# Patient Record
Sex: MALE | Race: WHITE | Employment: STUDENT | URBAN - METROPOLITAN AREA
[De-identification: names, ages, dates, MRNs, and addresses within clinical notes are randomized per-mention and may not be internally consistent; named-entity substitution may affect disease eponyms.]

---
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
## 2021-01-11 ENCOUNTER — OFFICE VISIT (OUTPATIENT)
Dept: URGENT CARE | Age: 21
End: 2021-01-11
Payer: COMMERCIAL

## 2021-01-11 VITALS — TEMPERATURE: 98 F | HEART RATE: 90 BPM | OXYGEN SATURATION: 96 % | RESPIRATION RATE: 18 BRPM

## 2021-01-11 DIAGNOSIS — Z20.822 EXPOSURE TO COVID-19 VIRUS: Primary | ICD-10-CM

## 2021-01-11 DIAGNOSIS — R09.89 RUNNY NOSE: ICD-10-CM

## 2021-01-11 PROCEDURE — 99202 OFFICE O/P NEW SF 15 MIN: CPT | Performed by: NURSE PRACTITIONER

## 2021-01-11 NOTE — PROGRESS NOTES
Subjective: (As above and below)       This patient was seen in [facility] at [address] [place] [address] Urgent [facility] while outdoors at their vehicle due to COVID-19 pandemic with PPE and focused examination in order to decrease community viral transmission. The patient/guardian gave verbal consent to treat. Chief Complaint   Patient presents with    Concern For COVID-19 (Coronavirus)     Pts mother reports exposure to COVID 19, reports cough and runny nose since yesterday     Wilda Calderon is a 21 y.o. male who presents for evaluation of : sore throat and runny nose. Symptom onset 2 days ago . Preceding illness: no.  No other identified aggravating or alleviating factors. Symptoms are constant and overall unchanged. Promotes no decrease in PO intake of fluids. Denies: severe lethargy, SOB, syncope/near syncope, vomiting/diarrhea, chest pain, chest pain with breathing, labored breathing, severe headache, non-intractable fevers . Recent travel: no  Known Exposure to COVID-19: yes  Known flu or strep contact: no       Review of Systems - negative except as listed above    Reviewed PmHx, RxHx, FmHx, SocHx, AllgHx and updated in chart. Family History   Problem Relation Age of Onset    No Known Problems Mother     No Known Problems Father     No Known Problems Sister     Other Other         Scot Syndrome    Bipolar Disorder Cousin        Past Medical History:   Diagnosis Date    Environmental allergies       Social History     Socioeconomic History    Marital status: SINGLE     Spouse name: Not on file    Number of children: Not on file    Years of education: Not on file    Highest education level: Not on file   Tobacco Use    Smoking status: Never Smoker    Smokeless tobacco: Never Used          Current Outpatient Medications   Medication Sig    metFORMIN (GLUCOPHAGE) 500 mg tablet Take 1 Tab by mouth two (2) times daily (with meals).     methylphenidate ER 54 mg 24 hr tab Take 1 Tab by mouth every morning. Max Daily Amount: 54 mg. Do not cut or chew    guanFACINE ER (INTUNIV) 4 mg Tb24 ER tablet Take 1 Tab by mouth nightly.  cetirizine (ZYRTEC) 10 mg tablet Take 10 mg by mouth daily. No current facility-administered medications for this visit. Objective:     Vitals:    01/11/21 1809   Pulse: 90   Resp: 18   Temp: 98 °F (36.7 °C)   SpO2: 96%       Physical Exam  General appearance  appears well hydrated and does not appear toxic, no acute distress  Eyes - EOMs intact. Non injected. No scleral icterus   Ears - no external swelling  Nose - nasal congestion. No purulent drainage  Mouth - OP clear without swelling, exudate or lesion. Mucus membranes moist. Uvula midline. Neck/Lymphatics  trachea midline, full AROM  Chest - Normal breathing effort no wheeze rales, rhonchi or diminishments bilaterally. Heart - RRR, no murmurs  Skin - no observable rashes or pallor  Neurologic- alert and oriented x 3  Psychiatric- normal mood, behavior and though content. Assessment/ Plan:     1. Exposure to COVID-19 virus    - NOVEL CORONAVIRUS (COVID-19)    2. Runny nose    - NOVEL CORONAVIRUS (COVID-19)      No evidence suggesting complication of illness at this time. Will discharge home with close monitoring and follow up.   To follow CDC isolation/quarantine guidelines  Supportive home care for mild symptoms advised- maintain adequate fluid intake, lozenges, over the counter Tylenol (for fever, aches, pains, chills), deep breathing exercises  Recommendation for self isolation/quarantine based on current CDC guidelines        Wade Cat NP

## 2021-01-14 LAB — SARS-COV-2, NAA: NOT DETECTED

## 2021-03-18 ENCOUNTER — TELEPHONE (OUTPATIENT)
Dept: PSYCHIATRY | Age: 21
End: 2021-03-18

## 2021-03-18 RX ORDER — METFORMIN HYDROCHLORIDE 500 MG/1
500 TABLET ORAL 2 TIMES DAILY WITH MEALS
Qty: 60 TAB | Refills: 2 | Status: SHIPPED | OUTPATIENT
Start: 2021-03-18 | End: 2021-03-22 | Stop reason: SDUPTHER

## 2021-03-22 ENCOUNTER — HOSPITAL ENCOUNTER (OUTPATIENT)
Dept: BEHAVIORAL/MENTAL HEALTH | Age: 21
Discharge: HOME | End: 2021-03-22

## 2021-03-22 VITALS
HEART RATE: 88 BPM | WEIGHT: 255 LBS | BODY MASS INDEX: 42.43 KG/M2 | DIASTOLIC BLOOD PRESSURE: 64 MMHG | SYSTOLIC BLOOD PRESSURE: 99 MMHG

## 2021-03-22 DIAGNOSIS — F90.2 ADHD (ATTENTION DEFICIT HYPERACTIVITY DISORDER), COMBINED TYPE: ICD-10-CM

## 2021-03-22 RX ORDER — ARIPIPRAZOLE 5 MG/1
5 TABLET ORAL
Qty: 90 TAB | Refills: 1 | Status: SHIPPED | OUTPATIENT
Start: 2021-03-22 | End: 2021-09-23 | Stop reason: SDUPTHER

## 2021-03-22 RX ORDER — METFORMIN HYDROCHLORIDE 500 MG/1
500 TABLET ORAL 2 TIMES DAILY WITH MEALS
Qty: 180 TAB | Refills: 1 | Status: SHIPPED | OUTPATIENT
Start: 2021-03-22 | End: 2021-09-23 | Stop reason: SDUPTHER

## 2021-03-22 RX ORDER — METHYLPHENIDATE HYDROCHLORIDE 54 MG/1
54 TABLET, EXTENDED RELEASE ORAL
Qty: 30 TAB | Refills: 0 | Status: SHIPPED | OUTPATIENT
Start: 2021-04-22 | End: 2021-08-17 | Stop reason: SDUPTHER

## 2021-03-22 RX ORDER — METHYLPHENIDATE HYDROCHLORIDE 54 MG/1
54 TABLET ORAL DAILY
Qty: 30 TAB | Refills: 0 | Status: SHIPPED | OUTPATIENT
Start: 2021-05-21 | End: 2021-03-22

## 2021-03-22 RX ORDER — METHYLPHENIDATE HYDROCHLORIDE 54 MG/1
54 TABLET, EXTENDED RELEASE ORAL
Qty: 30 TAB | Refills: 0 | Status: SHIPPED | OUTPATIENT
Start: 2021-03-22 | End: 2021-07-16 | Stop reason: SDUPTHER

## 2021-03-22 RX ORDER — METHYLPHENIDATE HYDROCHLORIDE 54 MG/1
54 TABLET ORAL DAILY
Qty: 30 TAB | Refills: 0 | Status: SHIPPED | OUTPATIENT
Start: 2021-04-21 | End: 2021-03-22

## 2021-03-22 RX ORDER — METHYLPHENIDATE HYDROCHLORIDE 54 MG/1
54 TABLET, EXTENDED RELEASE ORAL
Qty: 30 TAB | Refills: 0 | Status: SHIPPED | OUTPATIENT
Start: 2021-05-21 | End: 2021-08-17 | Stop reason: SDUPTHER

## 2021-03-22 RX ORDER — GUANFACINE 4 MG/1
4 TABLET, EXTENDED RELEASE ORAL
Qty: 90 TAB | Refills: 1 | Status: SHIPPED | OUTPATIENT
Start: 2021-03-22 | End: 2021-09-23 | Stop reason: SDUPTHER

## 2021-03-22 RX ORDER — METHYLPHENIDATE HYDROCHLORIDE 54 MG/1
54 TABLET ORAL DAILY
Qty: 30 TAB | Refills: 0 | Status: SHIPPED | OUTPATIENT
Start: 2021-03-22 | End: 2021-03-22

## 2021-03-22 NOTE — BH NOTES
Name: Umang Jackson    MRN:  [id number]   Time In: 4:10 PM     Time Out: 4:46 PM  Date: 3/22/2021           Collateral: mother, Donn Tran      Patient Identification: Umang Jackson is an 24year old single male high school graduate. Marquise Demarco lives with his parents and younger sister. He was diagnosed with autism as a toddler. Progress Note: Marquise Demarco has been doing at his new program through [employer]. He is going on daily outings with them and helping at a nonprofit called [employer] [facility] [address]. He likes his group and has only had one problem. He was looking at porn on the bus ride. Marquise Demarco has been sleeping well. He went for a 3 day stretch during which he barely slept. He is now sleeping fine and generally asks for his nighttime medications around 9 or 10. He needs to wake up at 5:30 on center days. He wakes up grumpy and needs a lot of reminders to keep getting ready. On weekends he generally sleeps until 10 and his mood is more agreeable. [facility] is concerned because Marquise Demarco is often angry and disagreeable with her. He tells her he doesn't love her. She wonders whether his mood is a problem. He is not negative or angry at his day program.    Marquise Demarco was soiling his pants for a while. He has seen a gastroenterologist who did not find any problems and suggested a stool softener. Marquise Demarco wants to know whether he can have alcohol because he is 21 now. He likes [employer] hard lemonade. He doesn't want to take his medication because he can't drink with it. Mental Status Examination    I. Reliability in Providing Information: Good (03/22/21 1702)    II. Personal Presentation: Looks stated age;Dresses appropriately; Other (comment)(obese) (03/22/21 1702)    III. Motor Activity: Normal;Unremarkable (03/22/21 1702)    IV. Speech Pattern: Normal rate;Normal rhythm (03/22/21 1702)    V. Mood: Euthymic (03/22/21 1702)    Vl. Eye Contact: Fleeting (03/22/21 1702)    Vll.  Affect: Appropriate;Smiling (03/22/21 1702)    VllI. Thought Processes        Thought Process: Organized;Goal directed (03/22/21 1702)        Thought Content: Unremarkable (03/22/21 1702)        Hallucinations: None (03/22/21 1702)        Delusions: None (03/22/21 1702)        Suicidal Ideation/Attempts: No (03/22/21 1702)                 Homicidal Ideation/Attempts: No (03/22/21 1702)        Obsessional and Compulsive Symptoms: Absent (03/22/21 1702)    IX. Cognitive Functions       Orientation Level: Oriented x4 (03/22/21 1702)       Neurologic State: Alert (03/22/21 1702)       Attention/Concentration: Attentive (03/22/21 1702)       Abstract Thinking: [place] (03/22/21 1702)       Estimate of Intelligence: Average (03/22/21 1702)       Judgment: Fair (03/22/21 1702)       Insight: Fair (03/22/21 1702)       Memory evaluation: No (03/22/21 1702)                                    X.Risks: None evident (03/22/21 1702)     XI. Strengths and Assets Inventory: Intelligence; Family Support; Cooperative; Adequate living arrangements; Interests/Hobbies (03/22/21 1702)    VITALS:     Patient Vitals for the past 24 hrs:   Pulse BP   03/22/21 1618 88 99/64     Wt Readings from Last 3 Encounters:   03/22/21 115.7 kg (255 lb)   10/07/20 111.1 kg (245 lb)   01/21/20 119.3 kg (263 lb) (>99 %, Z= 2.59)*     * Growth percentiles are based on CDC (Boys, 2-20 Years) data. Temp Readings from Last 3 Encounters:   01/11/21 98 °F (36.7 °C)     BP Readings from Last 3 Encounters:   03/22/21 99/64   10/21/19 110/68   08/13/19 110/70     Pulse Readings from Last 3 Encounters:   03/22/21 88   01/11/21 90   10/21/19 100       Assessment: Deisy Peterson is likely a bit sleep deprived which is causing the irritability on center days. He is not going to sleep earlier on those days.       DSM 5 Diagnoses:     Patient Active Problem List    Diagnosis Date Noted    ADHD (attention deficit hyperactivity disorder), combined type 07/02/2018    Autistic disorder 07/02/2018         Plan:   1. Continue Methylphenidate ER 54 mg po daily in the AM.  2.  Continue Intuniv 4 mg po daily at HS. 3.  Continue Abilify 5 mg HS -  4. Continue Metformin 500 mg po 2 times daily. 5.  Return for follow up in 6 months. 6.  Suggested that Marquise Demarco could have 1/2 [employer] Lemonade occasionally.

## 2021-06-03 ENCOUNTER — HOSPITAL ENCOUNTER (EMERGENCY)
Age: 21
Discharge: HOME | End: 2021-06-03
Attending: EMERGENCY MEDICINE
Payer: COMMERCIAL

## 2021-06-03 VITALS
RESPIRATION RATE: 16 BRPM | HEIGHT: 64 IN | OXYGEN SATURATION: 100 % | WEIGHT: 257.5 LBS | BODY MASS INDEX: 43.96 KG/M2 | DIASTOLIC BLOOD PRESSURE: 99 MMHG | HEART RATE: 97 BPM | SYSTOLIC BLOOD PRESSURE: 149 MMHG | TEMPERATURE: 98 F

## 2021-06-03 DIAGNOSIS — K92.1 HEMATOCHEZIA: Primary | ICD-10-CM

## 2021-06-03 DIAGNOSIS — B37.89 CANDIDIASIS OF ANUS: ICD-10-CM

## 2021-06-03 LAB
ALBUMIN SERPL-MCNC: 4.1 G/DL (ref 3.5–5)
ALBUMIN/GLOB SERPL: 1.2 {RATIO} (ref 1.1–2.2)
ALP SERPL-CCNC: 42 U/L (ref 45–117)
ALT SERPL-CCNC: 67 U/L (ref 12–78)
ANION GAP SERPL CALC-SCNC: 5 MMOL/L (ref 5–15)
AST SERPL-CCNC: 51 U/L (ref 15–37)
BASOPHILS # BLD: 0 K/UL (ref 0–0.1)
BASOPHILS NFR BLD: 1 % (ref 0–1)
BILIRUB SERPL-MCNC: 0.4 MG/DL (ref 0.2–1)
BUN SERPL-MCNC: 10 MG/DL (ref 6–20)
BUN/CREAT SERPL: 12 (ref 12–20)
CALCIUM SERPL-MCNC: 9.4 MG/DL (ref 8.5–10.1)
CHLORIDE SERPL-SCNC: 102 MMOL/L (ref 97–108)
CO2 SERPL-SCNC: 29 MMOL/L (ref 21–32)
CREAT SERPL-MCNC: 0.83 MG/DL (ref 0.7–1.3)
DIFFERENTIAL METHOD BLD: NORMAL
EOSINOPHIL # BLD: 0.2 K/UL (ref 0–0.4)
EOSINOPHIL NFR BLD: 3 % (ref 0–7)
ERYTHROCYTE [DISTWIDTH] IN BLOOD BY AUTOMATED COUNT: 13.2 % (ref 11.5–14.5)
GLOBULIN SER CALC-MCNC: 3.3 G/DL (ref 2–4)
GLUCOSE SERPL-MCNC: 93 MG/DL (ref 65–100)
HCT VFR BLD AUTO: 42.2 % (ref 36.6–50.3)
HGB BLD-MCNC: 14.1 G/DL (ref 12.1–17)
IMM GRANULOCYTES # BLD AUTO: 0 K/UL (ref 0–0.04)
IMM GRANULOCYTES NFR BLD AUTO: 0 % (ref 0–0.5)
LYMPHOCYTES # BLD: 2.6 K/UL (ref 0.8–3.5)
LYMPHOCYTES NFR BLD: 47 % (ref 12–49)
MCH RBC QN AUTO: 29.3 PG (ref 26–34)
MCHC RBC AUTO-ENTMCNC: 33.4 G/DL (ref 30–36.5)
MCV RBC AUTO: 87.6 FL (ref 80–99)
MONOCYTES # BLD: 0.5 K/UL (ref 0–1)
MONOCYTES NFR BLD: 9 % (ref 5–13)
NEUTS SEG # BLD: 2.2 K/UL (ref 1.8–8)
NEUTS SEG NFR BLD: 40 % (ref 32–75)
NRBC # BLD: 0 K/UL (ref 0–0.01)
NRBC BLD-RTO: 0 PER 100 WBC
PLATELET # BLD AUTO: 268 K/UL (ref 150–400)
PMV BLD AUTO: 9.7 FL (ref 8.9–12.9)
POTASSIUM SERPL-SCNC: 3.9 MMOL/L (ref 3.5–5.1)
PROT SERPL-MCNC: 7.4 G/DL (ref 6.4–8.2)
RBC # BLD AUTO: 4.82 M/UL (ref 4.1–5.7)
SODIUM SERPL-SCNC: 136 MMOL/L (ref 136–145)
WBC # BLD AUTO: 5.5 K/UL (ref 4.1–11.1)

## 2021-06-03 PROCEDURE — 99281 EMR DPT VST MAYX REQ PHY/QHP: CPT

## 2021-06-03 PROCEDURE — 36415 COLL VENOUS BLD VENIPUNCTURE: CPT

## 2021-06-03 PROCEDURE — 80053 COMPREHEN METABOLIC PANEL: CPT

## 2021-06-03 PROCEDURE — 85025 COMPLETE CBC W/AUTO DIFF WBC: CPT

## 2021-06-03 RX ORDER — ZINC OXIDE 400 MG/G
PASTE TOPICAL 2 TIMES DAILY
Qty: 1 TUBE | Refills: 0 | Status: SHIPPED | OUTPATIENT
Start: 2021-06-03

## 2021-06-03 NOTE — ED NOTES
I have reviewed discharge instructions with the parent. The parent verbalized understanding. Alert and stable for discharge.

## 2021-06-03 NOTE — ED PROVIDER NOTES
EMERGENCY DEPARTMENT HISTORY AND PHYSICAL EXAM      Date: 6/3/2021  Patient Name: Luisa Rayo    History of Presenting Illness     Chief Complaint   Patient presents with    Rectal Bleeding     dark red blood on the paper this morning. He had a similar episode 3 weeks ago. mom made an appointment with Dr Jovi Allen for evaluation. Staff there told her that if it happened in the meantime to come to the ER       History Provided By: Patient    HPI: Luisa Rayo, 24 y.o. male with a past medical history significant for Autism, ADHD presents to the ED with cc of intermittent, moderate bright red blood per rectum. Patient had 2 episodes. The first episode was 3 weeks ago and lasted about 12 hours. Second episode was last night and this morning. He had 2 episodes of bright red blood while wiping his anus after going to the bathroom. He has had a chronic problem of losing control of his bowels over the last several months. He was seen by GI recently and had a rectal exam was told it was normal.  His mother reports he has a poor diet and eats mostly chicken and potatoes. She has tried to add fiber supplements but he has been resistant to this. He is not having any diarrhea. He does have some moderate constipation. He has not had any fevers, chills or vomiting. He is currently not in pain but reports some mild discomfort around the anus. No other associated symptoms. No other exacerbating ameliorating factors. There are no other complaints, changes, or physical findings at this time. PCP: Brittni Orozco NP    No current facility-administered medications on file prior to encounter. Current Outpatient Medications on File Prior to Encounter   Medication Sig Dispense Refill    ARIPiprazole (ABILIFY) 5 mg tablet Take 1 Tab by mouth nightly. 90 Tab 1    guanFACINE ER (INTUNIV) 4 mg Tb24 ER tablet Take 1 Tab by mouth nightly.  90 Tab 1    metFORMIN (GLUCOPHAGE) 500 mg tablet Take 1 Tab by mouth two (2) times daily (with meals). 705 Tab 1    Concerta 54 mg CR tablet Take 1 Tab by mouth every morning. Max Daily Amount: 54 mg. 30 Tab 0    Concerta 54 mg CR tablet Take 1 Tab by mouth every morning. Max Daily Amount: 54 mg. 30 Tab 0    Concerta 54 mg CR tablet Take 1 Tab by mouth every morning. Max Daily Amount: 54 mg. 30 Tab 0    cetirizine (ZYRTEC) 10 mg tablet Take 10 mg by mouth daily. Past History     Past Medical History:  Past Medical History:   Diagnosis Date    Environmental allergies        Past Surgical History:  Past Surgical History:   Procedure Laterality Date    HX MYRINGOTOMY      12 surgical placement tubes    HX TONSIL AND ADENOIDECTOMY         Family History:  Family History   Problem Relation Age of Onset    No Known Problems Mother     No Known Problems Father     No Known Problems Sister     Other Other         Scot Syndrome    Bipolar Disorder Cousin        Social History:  Social History     Tobacco Use    Smoking status: Never Smoker    Smokeless tobacco: Never Used   Substance Use Topics    Alcohol use: Not on file    Drug use: Not on file       Allergies:  No Known Allergies      Review of Systems   Review of Systems   Constitutional: Negative for chills, diaphoresis, fatigue and fever. HENT: Negative for ear pain and sore throat. Eyes: Negative for pain and redness. Respiratory: Negative for cough and shortness of breath. Cardiovascular: Negative for chest pain and leg swelling. Gastrointestinal: Positive for blood in stool. Negative for abdominal pain, diarrhea, nausea and vomiting. Endocrine: Negative for cold intolerance and heat intolerance. Genitourinary: Negative for flank pain and hematuria. Musculoskeletal: Negative for back pain and neck stiffness. Skin: Negative for rash and wound. Neurological: Negative for dizziness, syncope and headaches. All other systems reviewed and are negative.       Physical Exam   Physical Exam  Vitals and nursing note reviewed. Constitutional:       Appearance: He is well-developed. HENT:      Head: Normocephalic and atraumatic. Mouth/Throat:      Pharynx: No oropharyngeal exudate. Eyes:      Conjunctiva/sclera: Conjunctivae normal.      Pupils: Pupils are equal, round, and reactive to light. Cardiovascular:      Rate and Rhythm: Normal rate and regular rhythm. Heart sounds: No murmur heard. Pulmonary:      Effort: Pulmonary effort is normal. No respiratory distress. Breath sounds: Normal breath sounds. No wheezing. Abdominal:      General: Bowel sounds are normal. There is no distension. Palpations: Abdomen is soft. Tenderness: There is no abdominal tenderness. Genitourinary:         Comments: Moderate erythematous plaques around the anus that is tender to palpation. The area is sharply demarcated. There is no thrombosed external hemorrhoid. On digital exam there is some bright red blood at the tip of the finger with rectal exam.  There is no tenderness or fluctuance, no findings of abscess. Stool is brown. Musculoskeletal:         General: No deformity. Normal range of motion. Cervical back: Normal range of motion. Skin:     General: Skin is warm and dry. Findings: No rash. Neurological:      Mental Status: He is alert and oriented to person, place, and time.       Coordination: Coordination normal.   Psychiatric:         Behavior: Behavior normal.         Diagnostic Study Results     Labs -     Recent Results (from the past 24 hour(s))   CBC WITH AUTOMATED DIFF    Collection Time: 06/03/21 12:10 PM   Result Value Ref Range    WBC 5.5 4.1 - 11.1 K/uL    RBC 4.82 4.10 - 5.70 M/uL    HGB 14.1 12.1 - 17.0 g/dL    HCT 42.2 36.6 - 50.3 %    MCV 87.6 80.0 - 99.0 FL    MCH 29.3 26.0 - 34.0 PG    MCHC 33.4 30.0 - 36.5 g/dL    RDW 13.2 11.5 - 14.5 %    PLATELET 383 830 - 787 K/uL    MPV 9.7 8.9 - 12.9 FL    NRBC 0.0 0  WBC    ABSOLUTE NRBC 0. 00 0.00 - 0.01 K/uL    NEUTROPHILS 40 32 - 75 %    LYMPHOCYTES 47 12 - 49 %    MONOCYTES 9 5 - 13 %    EOSINOPHILS 3 0 - 7 %    BASOPHILS 1 0 - 1 %    IMMATURE GRANULOCYTES 0 0.0 - 0.5 %    ABS. NEUTROPHILS 2.2 1.8 - 8.0 K/UL    ABS. LYMPHOCYTES 2.6 0.8 - 3.5 K/UL    ABS. MONOCYTES 0.5 0.0 - 1.0 K/UL    ABS. EOSINOPHILS 0.2 0.0 - 0.4 K/UL    ABS. BASOPHILS 0.0 0.0 - 0.1 K/UL    ABS. IMM. GRANS. 0.0 0.00 - 0.04 K/UL    DF AUTOMATED     METABOLIC PANEL, COMPREHENSIVE    Collection Time: 06/03/21 12:10 PM   Result Value Ref Range    Sodium 136 136 - 145 mmol/L    Potassium 3.9 3.5 - 5.1 mmol/L    Chloride 102 97 - 108 mmol/L    CO2 29 21 - 32 mmol/L    Anion gap 5 5 - 15 mmol/L    Glucose 93 65 - 100 mg/dL    BUN 10 6 - 20 MG/DL    Creatinine 0.83 0.70 - 1.30 MG/DL    BUN/Creatinine ratio 12 12 - 20      GFR est AA >60 >60 ml/min/1.73m2    GFR est non-AA >60 >60 ml/min/1.73m2    Calcium 9.4 8.5 - 10.1 MG/DL    Bilirubin, total 0.4 0.2 - 1.0 MG/DL    ALT (SGPT) 67 12 - 78 U/L    AST (SGOT) 51 (H) 15 - 37 U/L    Alk. phosphatase 42 (L) 45 - 117 U/L    Protein, total 7.4 6.4 - 8.2 g/dL    Albumin 4.1 3.5 - 5.0 g/dL    Globulin 3.3 2.0 - 4.0 g/dL    A-G Ratio 1.2 1.1 - 2.2         Radiologic Studies -   No orders to display     CT Results  (Last 48 hours)    None        CXR Results  (Last 48 hours)    None            Medical Decision Making   I am the first provider for this patient. I reviewed the vital signs, available nursing notes, past medical history, past surgical history, family history and social history. Vital Signs-Reviewed the patient's vital signs. Patient Vitals for the past 12 hrs:   Temp Pulse Resp BP SpO2   06/03/21 1200 98 °F (36.7 °C) 97 16 (!) 149/99 100 %       Records Reviewed: Nursing records and medical records reviewed    MDM:  Patient presents with lower GI bleeding.  Currently with stable vitals and benign abdominal exam.  DDx: AVM, diverticulosis, diverticulitis, IBD, IBS, colitis, hemorrhoids. Will obtain two large bore IV's, provide IVF hydration, check labs including type and screen, check rectal exam and monitor closely for the need for additional imaging. Provider Notes (Medical Decision Making):   Patient is a 26-year-old male presenting with 2 episodes of bright red blood per rectum. He does have findings of a fungal infection of the skin around the anus. I suspect there may be an internal hemorrhoid as well. However his hemoglobin is normal, his abdominal exam is benign, and he has a GI physician that he sees as an outpatient. I will send a note to his doctors he had called the office today. I think a close outpatient follow-up is very reasonable. He may need to have a sigmoidoscopy or anoscopy to further investigate the source of bleeding. I will treat him with nystatin cream as I am not sure this is truly a fungal infection. Per his mother he does not keep the area dry after showers and does not wipe well. He he was given instructions on drying the area, keeping it dry, and applying some Desitin ointment. ED Course:   Initial assessment performed. The patients presenting problems have been discussed, and they are in agreement with the care plan formulated and outlined with them. I have encouraged them to ask questions as they arise throughout their visit. Critical Care:  None      Disposition:  2:36 PM  Kurtis Chaparro's  results have been reviewed with him. He has been counseled regarding his diagnosis. He verbally conveys understanding and agreement of the signs, symptoms, diagnosis, treatment and prognosis and additionally agrees to follow up as recommended with Dr. Alla Almonte NP in 24 - 48 hours. He also agrees with the care-plan and conveys that all of his questions have been answered.   I have also put together some discharge instructions for him that include: 1) educational information regarding their diagnosis, 2) how to care for their diagnosis at home, as well a 3) list of reasons why they would want to return to the ED prior to their follow-up appointment, should their condition change. DISCHARGE PLAN:  1. Current Discharge Medication List      START taking these medications    Details   zinc oxide-cod liver oil (Desitin) 40 % paste Apply  to affected area two (2) times a day. Qty: 1 Tube, Refills: 0  Start date: 6/3/2021           2. Follow-up Information     Follow up With Specialties Details Why Contact Radha Farias MD Gastroenterology In 1 week For a follow-up evaluation. You may need a scope to further evaluate this bleeding. Please keep the area dry around your anus. [place]  [phone]      [facility] EMERGENCY DEPT Emergency Medicine In 1 day If symptoms worsen [address]  [address] [facility]  [phone]        3. Return to ED if worse     Diagnosis     Clinical Impression:   1. Hematochezia    2. Candidiasis of anus        Attestations:    Anne Marie Wallace MD    Please note that this dictation was completed with Trust[employer], the computer voice recognition software. Quite often unanticipated grammatical, syntax, homophones, and other interpretive errors are inadvertently transcribed by the computer software. Please disregard these errors. Please excuse any errors that have escaped final proofreading. Thank you.

## 2021-06-03 NOTE — DISCHARGE INSTRUCTIONS
It was a pleasure taking care of you in our Emergency Department today. We know that when you come to [facility], you are entrusting us with your health, comfort, and safety. Our physicians and nurses honor that trust, and truly appreciate the opportunity to care for you and your loved ones. We also value your feedback. If you receive a survey about your Emergency Department experience today, please fill it out. We care about our patients' feedback, and we listen to what you have to say. Thank you!

## 2021-06-29 ENCOUNTER — TELEPHONE (OUTPATIENT)
Dept: PSYCHIATRY | Age: 21
End: 2021-06-29

## 2021-07-16 DIAGNOSIS — F90.2 ADHD (ATTENTION DEFICIT HYPERACTIVITY DISORDER), COMBINED TYPE: ICD-10-CM

## 2021-07-16 RX ORDER — METHYLPHENIDATE HYDROCHLORIDE 54 MG/1
54 TABLET, EXTENDED RELEASE ORAL
Qty: 30 TABLET | Refills: 0 | Status: SHIPPED | OUTPATIENT
Start: 2021-07-16 | End: 2021-08-17 | Stop reason: SDUPTHER

## 2021-08-17 ENCOUNTER — TELEPHONE (OUTPATIENT)
Dept: PSYCHIATRY | Age: 21
End: 2021-08-17

## 2021-08-17 DIAGNOSIS — F90.2 ADHD (ATTENTION DEFICIT HYPERACTIVITY DISORDER), COMBINED TYPE: ICD-10-CM

## 2021-08-17 RX ORDER — METHYLPHENIDATE HYDROCHLORIDE 54 MG/1
54 TABLET, EXTENDED RELEASE ORAL
Qty: 30 TABLET | Refills: 0 | Status: SHIPPED | OUTPATIENT
Start: 2021-09-16 | End: 2021-11-18 | Stop reason: SDUPTHER

## 2021-08-17 RX ORDER — METHYLPHENIDATE HYDROCHLORIDE 54 MG/1
54 TABLET, EXTENDED RELEASE ORAL
Qty: 30 TABLET | Refills: 0 | Status: SHIPPED | OUTPATIENT
Start: 2021-10-15 | End: 2021-12-13 | Stop reason: SDUPTHER

## 2021-08-17 RX ORDER — METHYLPHENIDATE HYDROCHLORIDE 54 MG/1
54 TABLET, EXTENDED RELEASE ORAL
Qty: 30 TABLET | Refills: 0 | Status: SHIPPED | OUTPATIENT
Start: 2021-08-17 | End: 2021-11-18 | Stop reason: SDUPTHER

## 2021-09-23 ENCOUNTER — HOSPITAL ENCOUNTER (OUTPATIENT)
Dept: BEHAVIORAL/MENTAL HEALTH | Age: 21
Discharge: HOME | End: 2021-09-23
Payer: COMMERCIAL

## 2021-09-23 DIAGNOSIS — F90.2 ADHD (ATTENTION DEFICIT HYPERACTIVITY DISORDER), COMBINED TYPE: ICD-10-CM

## 2021-09-23 DIAGNOSIS — F84.0 AUTISTIC DISORDER: ICD-10-CM

## 2021-09-23 PROCEDURE — 99442 PR PHYS/QHP TELEPHONE EVALUATION 11-20 MIN: CPT | Performed by: NURSE PRACTITIONER

## 2021-09-23 RX ORDER — ARIPIPRAZOLE 5 MG/1
5 TABLET ORAL
Qty: 90 TABLET | Refills: 1 | Status: SHIPPED | OUTPATIENT
Start: 2021-09-23 | End: 2021-12-20 | Stop reason: SDUPTHER

## 2021-09-23 RX ORDER — GUANFACINE 4 MG/1
4 TABLET, EXTENDED RELEASE ORAL
Qty: 90 TABLET | Refills: 1 | Status: SHIPPED | OUTPATIENT
Start: 2021-09-23 | End: 2021-12-20 | Stop reason: SDUPTHER

## 2021-09-23 RX ORDER — METFORMIN HYDROCHLORIDE 500 MG/1
500 TABLET ORAL 2 TIMES DAILY WITH MEALS
Qty: 180 TABLET | Refills: 1 | Status: SHIPPED | OUTPATIENT
Start: 2021-09-23

## 2021-09-23 NOTE — BH NOTES
Name: Chaparro Field    MRN:  [id number]   Time In: 4:40 PM     Time Out: 4:57 PM  Date: 9/23/2021           Collateral: mother, Di Epley      Patient Identification: Chaparro Field is an 24year old single male high school graduate. Cristina Bowie lives with his parents and younger sister. He was diagnosed with autism as a toddler. Progress Note: This was a phone visit by audio only technology due to coronavirus pandemic. The patient is aware that he may receive a bill for this audio only encounter, depending on insurance coverage, and has provided verbal consent to proceed. Cristina Bowie was unable to come in today because his father was exposed to Covid. Cristina Bowie has been doing fairly well. He is regularly attending his day program and generally cooperating. Occasionally he does not want to go and makes up excuses. 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  He is able to complete the work and gets along with his coworkers. Teresa reports that several times per week Cristina Bowie throws up his pills. This can occur in the AM or PM.  He states that he takes too many pills. He does not vomit when not taking pills. Kurtis's behavior is about the same. He is working with an [facility] specialist and making little progress. Most of his behaviors occur at home. Cristina Bowie generally sleeps well. He wakes up and watches TV once or twice per week. He is able to sleep in on the weekends to make up for it. Mental Status Examination    I. Reliability in Providing Information: Fair (09/23/21 1655)    II. Personal Presentation: Other (comment) (phone consult) (09/23/21 1655)    III. Motor Activity: Other (comment) (09/23/21 1655)    IV. Speech Pattern: Normal rate;Normal rhythm (09/23/21 1655)    V. Mood: Euthymic (09/23/21 1655)    Vl. Eye Contact: Other (Comment) (phone consult) (09/23/21 1655)    Vll. Affect: Other (comment) (09/23/21 1655)    VllI.  Thought Processes Thought Process: Organized;Goal directed (09/23/21 1655)        Thought Content: Unremarkable (09/23/21 1655)        Hallucinations: None (09/23/21 1655)        Delusions: None (09/23/21 1655)        Suicidal Ideation/Attempts: No (09/23/21 1655)                 Homicidal Ideation/Attempts: No (09/23/21 1655)        Obsessional and Compulsive Symptoms: Absent (09/23/21 1655)    IX. Cognitive Functions       Orientation Level: Oriented x4 (09/23/21 1655)       Neurologic State: Alert (09/23/21 1655)       Attention/Concentration: Attentive (09/23/21 1655)       Abstract Thinking: [place] (09/23/21 1655)       Estimate of Intelligence: Average (09/23/21 1655)       Judgment: Fair (09/23/21 1655)       Insight: Fair (09/23/21 1655)       Memory evaluation: No (09/23/21 1655)                                    X.      XI. Strengths and Assets Inventory: Intelligence; Family Support; Cooperative; Adequate living arrangements; Interests/Hobbies (09/23/21 1655)    VITALS:     No data found. Wt Readings from Last 3 Encounters:   06/03/21 116.8 kg (257 lb 8 oz)   03/22/21 115.7 kg (255 lb)   10/07/20 111.1 kg (245 lb)     Temp Readings from Last 3 Encounters:   06/03/21 98 °F (36.7 °C)   01/11/21 98 °F (36.7 °C)     BP Readings from Last 3 Encounters:   06/03/21 (!) 149/99   03/22/21 99/64   10/21/19 110/68     Pulse Readings from Last 3 Encounters:   06/03/21 97   03/22/21 88   01/11/21 90       Assessment: Iqra Clement likely has a hyperactive gag reflex. He seems to be stable on his current medications. DSM 5 Diagnoses:     Patient Active Problem List    Diagnosis Date Noted    ADHD (attention deficit hyperactivity disorder), combined type 07/02/2018    Autistic disorder 07/02/2018         Plan:   1. Continue Methylphenidate ER 54 mg po daily in the AM.  2.  Continue Intuniv 4 mg po daily at HS. 3.  Continue Abilify 5 mg HS -  4. Continue Metformin 500 mg po 2 times daily.    5.  Return for follow up in 6 months.

## 2021-10-07 ENCOUNTER — TELEPHONE (OUTPATIENT)
Dept: PSYCHIATRY | Age: 21
End: 2021-10-07

## 2021-11-18 ENCOUNTER — TELEPHONE (OUTPATIENT)
Dept: PSYCHIATRY | Age: 21
End: 2021-11-18

## 2021-11-18 DIAGNOSIS — F90.2 ADHD (ATTENTION DEFICIT HYPERACTIVITY DISORDER), COMBINED TYPE: ICD-10-CM

## 2021-11-18 RX ORDER — METHYLPHENIDATE HYDROCHLORIDE 54 MG/1
54 TABLET, EXTENDED RELEASE ORAL
Qty: 30 TABLET | Refills: 0 | Status: SHIPPED | OUTPATIENT
Start: 2021-12-17

## 2021-11-18 RX ORDER — METHYLPHENIDATE HYDROCHLORIDE 54 MG/1
54 TABLET, EXTENDED RELEASE ORAL
Qty: 30 TABLET | Refills: 0 | Status: SHIPPED | OUTPATIENT
Start: 2021-11-18

## 2021-12-13 ENCOUNTER — TELEPHONE (OUTPATIENT)
Dept: PSYCHIATRY | Age: 21
End: 2021-12-13

## 2021-12-13 DIAGNOSIS — F90.2 ADHD (ATTENTION DEFICIT HYPERACTIVITY DISORDER), COMBINED TYPE: ICD-10-CM

## 2021-12-13 RX ORDER — METHYLPHENIDATE HYDROCHLORIDE 54 MG/1
54 TABLET, EXTENDED RELEASE ORAL
Qty: 90 TABLET | Refills: 0 | Status: SHIPPED | OUTPATIENT
Start: 2021-12-13 | End: 2021-12-17

## 2021-12-17 DIAGNOSIS — F90.0 ATTENTION DEFICIT HYPERACTIVITY DISORDER, INATTENTIVE TYPE: Primary | ICD-10-CM

## 2021-12-17 RX ORDER — METHYLPHENIDATE HYDROCHLORIDE 54 MG/1
54 TABLET ORAL
Qty: 90 TABLET | Refills: 0 | Status: SHIPPED | OUTPATIENT
Start: 2021-12-17

## 2021-12-20 ENCOUNTER — TELEPHONE (OUTPATIENT)
Dept: PSYCHIATRY | Age: 21
End: 2021-12-20

## 2021-12-20 DIAGNOSIS — F90.2 ADHD (ATTENTION DEFICIT HYPERACTIVITY DISORDER), COMBINED TYPE: ICD-10-CM

## 2021-12-20 RX ORDER — GUANFACINE 4 MG/1
4 TABLET, EXTENDED RELEASE ORAL
Qty: 90 TABLET | Refills: 1 | Status: SHIPPED | OUTPATIENT
Start: 2021-12-20

## 2021-12-20 RX ORDER — ARIPIPRAZOLE 5 MG/1
5 TABLET ORAL
Qty: 90 TABLET | Refills: 1 | Status: SHIPPED | OUTPATIENT
Start: 2021-12-20

## 2022-03-19 PROBLEM — F90.2 ADHD (ATTENTION DEFICIT HYPERACTIVITY DISORDER), COMBINED TYPE: Status: ACTIVE | Noted: 2018-07-02

## 2022-03-19 PROBLEM — F84.0 AUTISTIC DISORDER: Status: ACTIVE | Noted: 2018-07-02

## 2022-11-21 ENCOUNTER — TRANSCRIBE ORDER (OUTPATIENT)
Dept: SCHEDULING | Age: 22
End: 2022-11-21

## 2022-11-21 DIAGNOSIS — M54.50 CHRONIC MIDLINE LOW BACK PAIN WITHOUT SCIATICA: ICD-10-CM

## 2022-11-21 DIAGNOSIS — G89.29 CHRONIC MIDLINE LOW BACK PAIN WITHOUT SCIATICA: ICD-10-CM

## 2022-11-21 DIAGNOSIS — M79.18 BUTTOCK PAIN: ICD-10-CM

## 2022-11-21 DIAGNOSIS — M48.48XA STRESS FRACTURE OF SACRUM, INITIAL ENCOUNTER: ICD-10-CM

## 2022-11-21 DIAGNOSIS — M53.3 COCCYDYNIA: Primary | ICD-10-CM

## 2022-11-30 ENCOUNTER — TRANSCRIBE ORDER (OUTPATIENT)
Dept: SCHEDULING | Age: 22
End: 2022-11-30

## 2022-11-30 DIAGNOSIS — M48.48XA STRESS FRACTURE OF SACRUM, INITIAL ENCOUNTER: Primary | ICD-10-CM

## 2022-11-30 DIAGNOSIS — M53.3 COCCYGODYNIA: ICD-10-CM

## 2022-12-19 ENCOUNTER — HOSPITAL ENCOUNTER (OUTPATIENT)
Dept: MRI IMAGING | Age: 22
Discharge: HOME | End: 2022-12-19
Attending: PHYSICAL MEDICINE & REHABILITATION
Payer: COMMERCIAL

## 2022-12-19 DIAGNOSIS — M53.3 COCCYGODYNIA: ICD-10-CM

## 2022-12-19 DIAGNOSIS — M48.48XA STRESS FRACTURE OF SACRUM, INITIAL ENCOUNTER: ICD-10-CM

## 2022-12-19 PROCEDURE — 72195 MRI PELVIS W/O DYE: CPT

## 2023-01-27 ENCOUNTER — APPOINTMENT (OUTPATIENT)
Dept: GENERAL RADIOLOGY | Age: 23
End: 2023-01-27
Attending: PHYSICIAN ASSISTANT
Payer: MEDICAID

## 2023-01-27 ENCOUNTER — APPOINTMENT (OUTPATIENT)
Dept: ULTRASOUND IMAGING | Age: 23
End: 2023-01-27
Attending: PHYSICIAN ASSISTANT
Payer: MEDICAID

## 2023-01-27 ENCOUNTER — HOSPITAL ENCOUNTER (EMERGENCY)
Age: 23
Discharge: HOME | End: 2023-01-27
Attending: EMERGENCY MEDICINE
Payer: MEDICAID

## 2023-01-27 VITALS
TEMPERATURE: 98.2 F | BODY MASS INDEX: 47.46 KG/M2 | HEART RATE: 99 BPM | WEIGHT: 278 LBS | HEIGHT: 64 IN | SYSTOLIC BLOOD PRESSURE: 129 MMHG | DIASTOLIC BLOOD PRESSURE: 84 MMHG | RESPIRATION RATE: 16 BRPM | OXYGEN SATURATION: 95 %

## 2023-01-27 DIAGNOSIS — K76.0 HEPATIC STEATOSIS: ICD-10-CM

## 2023-01-27 DIAGNOSIS — R05.1 ACUTE COUGH: ICD-10-CM

## 2023-01-27 DIAGNOSIS — R10.11 ABDOMINAL PAIN, RIGHT UPPER QUADRANT: Primary | ICD-10-CM

## 2023-01-27 LAB
ALBUMIN SERPL-MCNC: 3.9 G/DL (ref 3.5–5)
ALBUMIN/GLOB SERPL: 1.1 (ref 1.1–2.2)
ALP SERPL-CCNC: 43 U/L (ref 45–117)
ALT SERPL-CCNC: 40 U/L (ref 12–78)
AMORPH CRY URNS QL MICRO: ABNORMAL
ANION GAP SERPL CALC-SCNC: 7 MMOL/L (ref 5–15)
APPEARANCE UR: ABNORMAL
AST SERPL-CCNC: 28 U/L (ref 15–37)
BACTERIA URNS QL MICRO: NEGATIVE /HPF
BASOPHILS # BLD: 0 K/UL (ref 0–0.1)
BASOPHILS NFR BLD: 0 % (ref 0–1)
BILIRUB SERPL-MCNC: 0.4 MG/DL (ref 0.2–1)
BILIRUB UR QL: NEGATIVE
BUN SERPL-MCNC: 12 MG/DL (ref 6–20)
BUN/CREAT SERPL: 12 (ref 12–20)
CALCIUM SERPL-MCNC: 9.5 MG/DL (ref 8.5–10.1)
CHLORIDE SERPL-SCNC: 104 MMOL/L (ref 97–108)
CO2 SERPL-SCNC: 30 MMOL/L (ref 21–32)
COLOR UR: ABNORMAL
COVID-19 RAPID TEST, COVR: NOT DETECTED
CREAT SERPL-MCNC: 0.98 MG/DL (ref 0.7–1.3)
DIFFERENTIAL METHOD BLD: ABNORMAL
EOSINOPHIL # BLD: 0.1 K/UL (ref 0–0.4)
EOSINOPHIL NFR BLD: 2 % (ref 0–7)
EPITH CASTS URNS QL MICRO: ABNORMAL /LPF
ERYTHROCYTE [DISTWIDTH] IN BLOOD BY AUTOMATED COUNT: 13.2 % (ref 11.5–14.5)
FLUAV AG NPH QL IA: NEGATIVE
FLUBV AG NOSE QL IA: NEGATIVE
GLOBULIN SER CALC-MCNC: 3.5 G/DL (ref 2–4)
GLUCOSE SERPL-MCNC: 102 MG/DL (ref 65–100)
GLUCOSE UR STRIP.AUTO-MCNC: NEGATIVE MG/DL
HCT VFR BLD AUTO: 42.3 % (ref 36.6–50.3)
HGB BLD-MCNC: 14.4 G/DL (ref 12.1–17)
HGB UR QL STRIP: NEGATIVE
IMM GRANULOCYTES # BLD AUTO: 0 K/UL (ref 0–0.04)
IMM GRANULOCYTES NFR BLD AUTO: 1 % (ref 0–0.5)
KETONES UR QL STRIP.AUTO: ABNORMAL MG/DL
LEUKOCYTE ESTERASE UR QL STRIP.AUTO: NEGATIVE
LIPASE SERPL-CCNC: 134 U/L (ref 73–393)
LYMPHOCYTES # BLD: 2.4 K/UL (ref 0.8–3.5)
LYMPHOCYTES NFR BLD: 36 % (ref 12–49)
MCH RBC QN AUTO: 29.8 PG (ref 26–34)
MCHC RBC AUTO-ENTMCNC: 34 G/DL (ref 30–36.5)
MCV RBC AUTO: 87.4 FL (ref 80–99)
MONOCYTES # BLD: 0.6 K/UL (ref 0–1)
MONOCYTES NFR BLD: 9 % (ref 5–13)
MUCOUS THREADS URNS QL MICRO: ABNORMAL /LPF
NEUTS SEG # BLD: 3.6 K/UL (ref 1.8–8)
NEUTS SEG NFR BLD: 52 % (ref 32–75)
NITRITE UR QL STRIP.AUTO: NEGATIVE
NRBC # BLD: 0 K/UL (ref 0–0.01)
NRBC BLD-RTO: 0 PER 100 WBC
PH UR STRIP: 7.5 (ref 5–8)
PLATELET # BLD AUTO: 251 K/UL (ref 150–400)
PMV BLD AUTO: 9.5 FL (ref 8.9–12.9)
POTASSIUM SERPL-SCNC: 3.9 MMOL/L (ref 3.5–5.1)
PROT SERPL-MCNC: 7.4 G/DL (ref 6.4–8.2)
PROT UR STRIP-MCNC: ABNORMAL MG/DL
RBC # BLD AUTO: 4.84 M/UL (ref 4.1–5.7)
RBC #/AREA URNS HPF: ABNORMAL /HPF (ref 0–5)
SODIUM SERPL-SCNC: 141 MMOL/L (ref 136–145)
SOURCE, COVRS: NORMAL
SP GR UR REFRACTOMETRY: 1.01 (ref 1–1.03)
UA: UC IF INDICATED,UAUC: ABNORMAL
UROBILINOGEN UR QL STRIP.AUTO: 0.2 EU/DL (ref 0.2–1)
WBC # BLD AUTO: 6.8 K/UL (ref 4.1–11.1)
WBC URNS QL MICRO: ABNORMAL /HPF (ref 0–4)

## 2023-01-27 PROCEDURE — 87804 INFLUENZA ASSAY W/OPTIC: CPT

## 2023-01-27 PROCEDURE — 81001 URINALYSIS AUTO W/SCOPE: CPT

## 2023-01-27 PROCEDURE — 76700 US EXAM ABDOM COMPLETE: CPT

## 2023-01-27 PROCEDURE — 99284 EMERGENCY DEPT VISIT MOD MDM: CPT

## 2023-01-27 PROCEDURE — 87635 SARS-COV-2 COVID-19 AMP PRB: CPT

## 2023-01-27 PROCEDURE — 36415 COLL VENOUS BLD VENIPUNCTURE: CPT

## 2023-01-27 PROCEDURE — 85025 COMPLETE CBC W/AUTO DIFF WBC: CPT

## 2023-01-27 PROCEDURE — 80053 COMPREHEN METABOLIC PANEL: CPT

## 2023-01-27 PROCEDURE — 71046 X-RAY EXAM CHEST 2 VIEWS: CPT

## 2023-01-27 PROCEDURE — 83690 ASSAY OF LIPASE: CPT

## 2023-01-27 RX ORDER — OMEPRAZOLE 20 MG/1
20 CAPSULE, DELAYED RELEASE ORAL DAILY
Qty: 21 CAPSULE | Refills: 0 | Status: SHIPPED | OUTPATIENT
Start: 2023-01-27 | End: 2023-01-27 | Stop reason: SDUPTHER

## 2023-01-27 RX ORDER — OMEPRAZOLE 20 MG/1
20 CAPSULE, DELAYED RELEASE ORAL DAILY
Qty: 21 CAPSULE | Refills: 0 | Status: SHIPPED | OUTPATIENT
Start: 2023-01-27 | End: 2023-02-17

## 2023-01-27 NOTE — DISCHARGE INSTRUCTIONS
As we discussed, your evaluation the emergency department did not find a clear source of your symptoms. Follow-up with your PCP in the next 3 days for reevaluation of your symptoms. You may return to the ED anytime for any concerning or worsening symptoms. You are being treated empirically for GERD, as this may be the source of your symptoms, so please discuss this with your PCP. Also discuss the findings of hepatic steatosis with your PCP.

## 2023-01-27 NOTE — ED PROVIDER NOTES
\[facility]\"" EMERGENCY DEPT  EMERGENCY DEPARTMENT ENCOUNTER       Pt Name: Bri Howe  MRN: [id number]  [place] 2000  Date of evaluation: 1/27/2023  Provider: VIDHYA Lerma   PCP: Trixie Husain NP  Note Started: 3:49 PM 1/27/23     CHIEF COMPLAINT       Chief Complaint   Patient presents with    Abdominal Pain     Right lower quadrant and RUQ pain yesterday. No vomiting yesterday or today. He did gag once. Pt is special needs. HISTORY OF PRESENT ILLNESS: 1 or more elements      History From: Patient and Patient's Mother  HPI Limitations : Other (autism)     Bri Howe is a 25 y.o. male who presents by POV with complaints of RUQ abdominal pain that started yesterday. The pain is constant and exacerbated with coughing. He notes a mild, non-productive cough with associated ST but denies fever, chills, congestion or otalgia. There has been no treatment PTA. He denies any abdominal surgical history. Nursing Notes were all reviewed and agreed with or any disagreements were addressed in the HPI. REVIEW OF SYSTEMS      Review of Systems   Constitutional:  Negative for chills, diaphoresis and fever. HENT:  Positive for sore throat. Negative for congestion, ear pain and rhinorrhea. Respiratory:  Positive for cough. Negative for shortness of breath. Cardiovascular:  Negative for chest pain. Gastrointestinal:  Positive for abdominal pain. Negative for constipation, diarrhea, nausea and vomiting. Genitourinary:  Negative for difficulty urinating, dysuria, frequency and hematuria. Musculoskeletal:  Negative for arthralgias and myalgias. Neurological:  Negative for headaches. All other systems reviewed and are negative. Positives and Pertinent negatives as per HPI.     PAST HISTORY     Past Medical History:  Past Medical History:   Diagnosis Date    Environmental allergies        Past Surgical History:  Past Surgical History:   Procedure Laterality Date    HX MYRINGOTOMY      12 surgical placement tubes    HX TONSIL AND ADENOIDECTOMY         Family History:  Family History   Problem Relation Age of Onset    No Known Problems Mother     No Known Problems Father     No Known Problems Sister     Other Other         Scot Syndrome    Bipolar Disorder Cousin        Social History:  Social History     Tobacco Use    Smoking status: Never    Smokeless tobacco: Never       Allergies:  No Known Allergies    CURRENT MEDICATIONS      Previous Medications    ARIPIPRAZOLE (ABILIFY) 5 MG TABLET    Take 1 Tablet by mouth nightly. CETIRIZINE (ZYRTEC) 10 MG TABLET    Take 10 mg by mouth daily. CONCERTA 54 MG CR TABLET    Take 1 Tablet by mouth every morning. Max Daily Amount: 54 mg. CONCERTA 54 MG CR TABLET    Take 1 Tablet by mouth every morning. Max Daily Amount: 54 mg. GUANFACINE ER (INTUNIV) 4 MG TB24 ER TABLET    Take 1 Tablet by mouth nightly. METFORMIN (GLUCOPHAGE) 500 MG TABLET    Take 1 Tablet by mouth two (2) times daily (with meals). METHYLPHENIDATE ER 54 MG 24 HR TAB    Take 1 Tablet by mouth every morning. Max Daily Amount: 54 mg. ZINC OXIDE-COD LIVER OIL (DESITIN) 40 % PASTE    Apply  to affected area two (2) times a day. PHYSICAL EXAM      ED Triage Vitals [01/27/23 1409]   ED Encounter Vitals Group      /84      Pulse (Heart Rate) 99      Resp Rate 16      Temp 98.2 °F (36.8 °C)      Temp src       O2 Sat (%) 95 %      Weight 278 lb      Height 5' 4\"        Physical Exam  Vitals and nursing note reviewed. Constitutional:       General: He is not in acute distress. Appearance: He is well-developed. He is obese. He is not diaphoretic. Comments: 25 y.o.  male    HENT:      Head: Normocephalic and atraumatic. Mouth/Throat:      Mouth: Mucous membranes are moist.      Pharynx: Oropharynx is clear. No pharyngeal swelling, oropharyngeal exudate or posterior oropharyngeal erythema. Tonsils: No tonsillar exudate. Eyes:      General:         Right eye: No discharge. Left eye: No discharge. Conjunctiva/sclera: Conjunctivae normal.   Cardiovascular:      Rate and Rhythm: Normal rate and regular rhythm. Heart sounds: Normal heart sounds. No murmur heard. Pulmonary:      Effort: Pulmonary effort is normal. No respiratory distress. Breath sounds: Normal breath sounds. Abdominal:      General: Bowel sounds are normal.      Tenderness: There is abdominal tenderness in the right upper quadrant and epigastric area. Musculoskeletal:      Cervical back: Normal range of motion and neck supple. Skin:     General: Skin is warm and dry. Neurological:      Mental Status: He is alert and oriented to person, place, and time. Psychiatric:         Behavior: Behavior normal.        DIAGNOSTIC RESULTS   LABS:     Recent Results (from the past 12 hour(s))   CBC WITH AUTOMATED DIFF    Collection Time: 01/27/23  2:49 PM   Result Value Ref Range    WBC 6.8 4.1 - 11.1 K/uL    RBC 4.84 4.10 - 5.70 M/uL    HGB 14.4 12.1 - 17.0 g/dL    HCT 42.3 36.6 - 50.3 %    MCV 87.4 80.0 - 99.0 FL    MCH 29.8 26.0 - 34.0 PG    MCHC 34.0 30.0 - 36.5 g/dL    RDW 13.2 11.5 - 14.5 %    PLATELET 949 290 - 414 K/uL    MPV 9.5 8.9 - 12.9 FL    NRBC 0.0 0  WBC    ABSOLUTE NRBC 0.00 0.00 - 0.01 K/uL    NEUTROPHILS 52 32 - 75 %    LYMPHOCYTES 36 12 - 49 %    MONOCYTES 9 5 - 13 %    EOSINOPHILS 2 0 - 7 %    BASOPHILS 0 0 - 1 %    IMMATURE GRANULOCYTES 1 (H) 0.0 - 0.5 %    ABS. NEUTROPHILS 3.6 1.8 - 8.0 K/UL    ABS. LYMPHOCYTES 2.4 0.8 - 3.5 K/UL    ABS. MONOCYTES 0.6 0.0 - 1.0 K/UL    ABS. EOSINOPHILS 0.1 0.0 - 0.4 K/UL    ABS. BASOPHILS 0.0 0.0 - 0.1 K/UL    ABS. IMM.  GRANS. 0.0 0.00 - 0.04 K/UL    DF AUTOMATED     METABOLIC PANEL, COMPREHENSIVE    Collection Time: 01/27/23  2:49 PM   Result Value Ref Range    Sodium 141 136 - 145 mmol/L    Potassium 3.9 3.5 - 5.1 mmol/L    Chloride 104 97 - 108 mmol/L    CO2 30 21 - 32 mmol/L    Anion gap 7 5 - 15 mmol/L    Glucose 102 (H) 65 - 100 mg/dL    BUN 12 6 - 20 MG/DL    Creatinine 0.98 0.70 - 1.30 MG/DL    BUN/Creatinine ratio 12 12 - 20      eGFR >60 >60 ml/min/1.73m2    Calcium 9.5 8.5 - 10.1 MG/DL    Bilirubin, total 0.4 0.2 - 1.0 MG/DL    ALT (SGPT) 40 12 - 78 U/L    AST (SGOT) 28 15 - 37 U/L    Alk. phosphatase 43 (L) 45 - 117 U/L    Protein, total 7.4 6.4 - 8.2 g/dL    Albumin 3.9 3.5 - 5.0 g/dL    Globulin 3.5 2.0 - 4.0 g/dL    A-G Ratio 1.1 1.1 - 2.2     LIPASE    Collection Time: 01/27/23  2:49 PM   Result Value Ref Range    Lipase 134 73 - 393 U/L   INFLUENZA A+B VIRAL AGS    Collection Time: 01/27/23  2:49 PM   Result Value Ref Range    Influenza A Antigen Negative NEG      Influenza B Antigen Negative NEG     COVID-19 RAPID TEST    Collection Time: 01/27/23  2:49 PM   Result Value Ref Range    Specimen source Nasopharyngeal      COVID-19 rapid test Not detected NOTD          EKG: When ordered, EKG's are interpreted by the Emergency Department Physician in the absence of a cardiologist.  Please see their note for interpretation of EKG. RADIOLOGY:  Non-plain film images such as CT, Ultrasound and MRI are read by the radiologist. Plain radiographic images are visualized and preliminarily interpreted by the ED Provider with the below findings:     No acute findings on CXR     Interpretation per the Radiologist below, if available at the time of this note:     US ABD COMP   Final Result   1. Moderate hepatic steatosis is advanced for age. 2. Normal gallbladder and biliary tree. XR CHEST PA LAT   Final Result   No acute cardiopulmonary disease.                   PROCEDURES   Unless otherwise noted below, none  Procedures     CRITICAL CARE TIME   none    EMERGENCY DEPARTMENT COURSE and DIFFERENTIAL DIAGNOSIS/MDM   Vitals:    Vitals:    01/27/23 1409   BP: 129/84   Pulse: 99   Resp: 16   Temp: 98.2 °F (36.8 °C)   SpO2: 95%   Weight: 126.1 kg (278 lb)   Height: 5' 4\" (1.626 m) Patient was given the following medications:  Medications - No data to display    CONSULTS: (Who and What was discussed)  None    Chronic Conditions: autism    Social Determinants affecting Dx or Tx: None. Patient has very good support at home by mom. Records Reviewed (source and summary of external records): None    CC/HPI Summary, DDx, ED Course, and Reassessment: Patient presents the ED with stable vital signs and several complaints. Initial exam showed some right upper quadrant tenderness. Ultrasound was suggestive of hepatic steatosis but there was no cholangitis, cholelithiasis, choledocholithiasis, or biliary duct dilatation. Chest x-ray was negative for pneumonia. Influenza and COVID ruled out on rapid testing. Patient likely with viral URI and symptomatic GERD. Will treat with over-the-counter medications for upper respiratory complaints and Prilosec for GERD. Patient should follow-up with primary care medicine but can always return to the ED for deterioration. ED Course as of 01/28/23 1820   [place] Jan 27, 2023   1552 Case discussed with and care turned over to Alec Le, [facility]. [TK]   1640 Chest x-ray per my amputation shows no acute findings to include opacities, infiltrates or effusions. No cardiomegaly. [AJ]   4482 Labs, x-ray, ultrasound evaluated. No findings suggestive of hepatobiliary emergency, to include cholecystitis, choledocholithiasis, cholangitis. On reevaluation patient is well-appearing with no acute complaints. Abdomen is benign per my exam.  There was an incidental finding of hepatic steatosis which was discussed with the family and patient. Chest x-ray was clear and per my evaluation he had no adventitious breath sounds. Low suspicion for pneumonia. I will treat the patient for GERD as discussed per ARTURO Zuluaga treatment plan.  [AJ]      ED Course User Index  [AJ] VIDHYA Woods Aas  [TK] Jesus Cohn, [address]       Disposition Considerations (Tests not done, Shared Decision Making, Pt Expectation of Test or Tx.): none     FINAL IMPRESSION     1. Abdominal pain, right upper quadrant    2. Acute cough    3. Hepatic steatosis         DISPOSITION/PLAN       Discharge Note: The patient is stable for discharge home. The signs, symptoms, diagnosis, and discharge instructions have been discussed, understanding conveyed, and agreed upon. The patient is to follow up as recommended or return to ER should their symptoms worsen. PATIENT REFERRED TO:  Follow-up Information       Follow up With Specialties Details Why Contact Info    Janalee Lesches, NP Nurse Practitioner Schedule an appointment as soon as possible for a visit in 3 days  [address]                 DISCHARGE MEDICATIONS:  Discharge Medication List as of 1/27/2023  4:50 PM        CONTINUE these medications which have CHANGED    Details   omeprazole (PRILOSEC) 20 mg capsule Take 1 Capsule by mouth daily for 21 days. , Normal, Disp-21 Capsule, R-0           CONTINUE these medications which have NOT CHANGED    Details   ARIPiprazole (ABILIFY) 5 mg tablet Take 1 Tablet by mouth nightly., Normal, Disp-90 Tablet, R-1      guanFACINE ER (INTUNIV) 4 mg Tb24 ER tablet Take 1 Tablet by mouth nightly., Normal, Disp-90 Tablet, R-1      !! methylphenidate ER 54 mg 24 hr tab Take 1 Tablet by mouth every morning. Max Daily Amount: 54 mg., Normal, Disp-90 Tablet, R-0      !! Concerta 54 mg CR tablet Take 1 Tablet by mouth every morning. Max Daily Amount: 54 mg., Normal, Disp-30 Tablet, R-0, LAY      !! Concerta 54 mg CR tablet Take 1 Tablet by mouth every morning. Max Daily Amount: 54 mg., Normal, Disp-30 Tablet, R-0, LAY      metFORMIN (GLUCOPHAGE) 500 mg tablet Take 1 Tablet by mouth two (2) times daily (with meals). , Normal, Disp-180 Tablet, R-1      zinc oxide-cod liver oil (Desitin) 40 % paste Apply  to affected area two (2) times a day., Normal, Disp-1 Tube, R-0      cetirizine (ZYRTEC) 10 mg tablet Take 10 mg by mouth daily. , Historical Med       !! - Potential duplicate medications found. Please discuss with provider. DISCONTINUED MEDICATIONS:  Current Discharge Medication List          Shared Not Shared EMELI: I have seen and evaluated the patient. My supervision physician was available for consultation. I am the Primary Clinician of Record. VIDHYA Reyes (electronically signed)    (Please note that parts of this dictation were completed with voice recognition software. Quite often unanticipated grammatical, syntax, homophones, and other interpretive errors are inadvertently transcribed by the computer software. Please disregards these errors.  Please excuse any errors that have escaped final proofreading.)

## 2023-01-27 NOTE — Clinical Note
[address]  [facility] EMERGENCY DEPT  [address] [place] [id number] [phone]    Work/School Note    Date: 1/27/2023    To Whom It May concern:      Arie Rosa was seen and treated today in the emergency room by the following provider(s):  Attending Provider: Sven Suarez MD  Physician Assistant: Isela Jarvis, [address]  Physician Assistant: VIDHYA Bruno. Arie Rosa is excused from work/school on 01/27/23. He is clear to return to work/school on 01/28/23.         Sincerely,          VIDHYA Self

## 2023-05-15 ENCOUNTER — HOSPITAL ENCOUNTER (OUTPATIENT)
Facility: HOSPITAL | Age: 23
Setting detail: RECURRING SERIES
Discharge: HOME | End: 2023-05-18
Payer: MEDICAID

## 2023-05-15 PROCEDURE — 97802 MEDICAL NUTRITION INDIV IN: CPT | Performed by: DIETITIAN, REGISTERED

## 2023-05-17 ENCOUNTER — CLINICAL DOCUMENTATION (OUTPATIENT)
Age: 23
End: 2023-05-17

## 2023-05-17 NOTE — PROGRESS NOTES
Records received from PCP and per updated office protocol do not meet criteria for further review.  Schedule next available

## 2023-06-05 ENCOUNTER — HOSPITAL ENCOUNTER (OUTPATIENT)
Facility: HOSPITAL | Age: 23
Setting detail: RECURRING SERIES
Discharge: HOME | End: 2023-06-08
Payer: MEDICAID

## 2023-06-05 PROCEDURE — 97803 MED NUTRITION INDIV SUBSEQ: CPT | Performed by: DIETITIAN, REGISTERED

## 2023-06-05 NOTE — PROGRESS NOTES
Improve physical activity w/goal to  for 40 minutes 3  times per week + 6k steps per day goal .  Mom counting calories by hand. Est 5275-5709 kcal the 3 days since back from vacation. - Increase accountability and awareness of food choices by recording food and beverage intake by using a food journal at least 3 days per week. Nutrition Prescription and  Intervention Reviewed calorie recommendations for weight loss. Set target calories at 2000 kcal. Likely extra things that will be consumed by pt either at day program, boredom, or at night. Recommend pt to measure out portions with mom. Set goal with pt to tell mom when eating, even if boredom or at night so it can be accounted for as we try and determine success of weight loss measures. Patient Education:  []  Review current plan with patient   []  Other:    Handouts/  Information Provided: []  Carbohydrates  []  Protein  []  Fiber  []  Serving Sizes  []  Fluids  []  General guidelines []  Diabetes  []  Cholesterol  []  Sodium  []  SBGM  []  Food Journals  []  Others:      Patient Goals -mom tracking calorie intake.   -mom preportioning out snacks to help with reducing eating past full, boredom eating, and choosing lower calorie options.   -cut grass + gym 3 times per week. 6k step goal daily  -tell mom if have eaten at night or other snacks not provided by her so she can count for them.   -continue less boredom eating (go outside instead) and less eating past full.         PLAN  [x]  Continue on current plan []  Follow-up PRN   []  Discharge due to :    [x]  Next appt:  July 10 @ 11am     Dietitian: Tila Saeed MS, RD, CSSD    Date: 6/5/2023 Time: 10:59 AM

## 2023-06-19 ENCOUNTER — ANESTHESIA EVENT (OUTPATIENT)
Facility: HOSPITAL | Age: 23
End: 2023-06-19
Payer: MEDICAID

## 2023-06-19 ENCOUNTER — ANESTHESIA (OUTPATIENT)
Facility: HOSPITAL | Age: 23
End: 2023-06-19
Payer: MEDICAID

## 2023-06-19 ENCOUNTER — HOSPITAL ENCOUNTER (OUTPATIENT)
Facility: HOSPITAL | Age: 23
Discharge: HOME | End: 2023-06-19
Attending: INTERNAL MEDICINE | Admitting: INTERNAL MEDICINE
Payer: MEDICAID

## 2023-06-19 VITALS
RESPIRATION RATE: 18 BRPM | HEART RATE: 91 BPM | TEMPERATURE: 98 F | DIASTOLIC BLOOD PRESSURE: 59 MMHG | BODY MASS INDEX: 45.65 KG/M2 | WEIGHT: 274 LBS | OXYGEN SATURATION: 97 % | HEIGHT: 65 IN | SYSTOLIC BLOOD PRESSURE: 101 MMHG

## 2023-06-19 PROCEDURE — 88342 IMHCHEM/IMCYTCHM 1ST ANTB: CPT

## 2023-06-19 PROCEDURE — 2709999900 HC NON-CHARGEABLE SUPPLY: Performed by: INTERNAL MEDICINE

## 2023-06-19 PROCEDURE — 2500000003 HC RX 250 WO HCPCS: Performed by: NURSE ANESTHETIST, CERTIFIED REGISTERED

## 2023-06-19 PROCEDURE — 7100000010 HC PHASE II RECOVERY - FIRST 15 MIN: Performed by: INTERNAL MEDICINE

## 2023-06-19 PROCEDURE — 3700000000 HC ANESTHESIA ATTENDED CARE: Performed by: INTERNAL MEDICINE

## 2023-06-19 PROCEDURE — 3700000001 HC ADD 15 MINUTES (ANESTHESIA): Performed by: INTERNAL MEDICINE

## 2023-06-19 PROCEDURE — 2580000003 HC RX 258: Performed by: INTERNAL MEDICINE

## 2023-06-19 PROCEDURE — 3600007502: Performed by: INTERNAL MEDICINE

## 2023-06-19 PROCEDURE — 88305 TISSUE EXAM BY PATHOLOGIST: CPT

## 2023-06-19 PROCEDURE — 3600007512: Performed by: INTERNAL MEDICINE

## 2023-06-19 PROCEDURE — 7100000011 HC PHASE II RECOVERY - ADDTL 15 MIN: Performed by: INTERNAL MEDICINE

## 2023-06-19 PROCEDURE — 6360000002 HC RX W HCPCS: Performed by: NURSE ANESTHETIST, CERTIFIED REGISTERED

## 2023-06-19 RX ORDER — LEVOCETIRIZINE DIHYDROCHLORIDE 5 MG/1
5 TABLET, FILM COATED ORAL NIGHTLY
COMMUNITY

## 2023-06-19 RX ORDER — FLUTICASONE PROPIONATE 50 MCG
1 SPRAY, SUSPENSION (ML) NASAL DAILY
COMMUNITY

## 2023-06-19 RX ORDER — SODIUM CHLORIDE 9 MG/ML
25 INJECTION, SOLUTION INTRAVENOUS PRN
Status: DISCONTINUED | OUTPATIENT
Start: 2023-06-19 | End: 2023-06-19 | Stop reason: HOSPADM

## 2023-06-19 RX ORDER — SODIUM CHLORIDE 0.9 % (FLUSH) 0.9 %
5-40 SYRINGE (ML) INJECTION EVERY 12 HOURS SCHEDULED
Status: DISCONTINUED | OUTPATIENT
Start: 2023-06-19 | End: 2023-06-19 | Stop reason: HOSPADM

## 2023-06-19 RX ORDER — QUETIAPINE FUMARATE 50 MG/1
50 TABLET, EXTENDED RELEASE ORAL NIGHTLY
COMMUNITY

## 2023-06-19 RX ORDER — SODIUM CHLORIDE 9 MG/ML
INJECTION, SOLUTION INTRAVENOUS CONTINUOUS
Status: DISCONTINUED | OUTPATIENT
Start: 2023-06-19 | End: 2023-06-19 | Stop reason: HOSPADM

## 2023-06-19 RX ORDER — DIVALPROEX SODIUM 250 MG/1
250 TABLET, DELAYED RELEASE ORAL 2 TIMES DAILY
COMMUNITY

## 2023-06-19 RX ORDER — SODIUM CHLORIDE 0.9 % (FLUSH) 0.9 %
5-40 SYRINGE (ML) INJECTION PRN
Status: DISCONTINUED | OUTPATIENT
Start: 2023-06-19 | End: 2023-06-19 | Stop reason: HOSPADM

## 2023-06-19 RX ORDER — LIDOCAINE HYDROCHLORIDE 20 MG/ML
INJECTION, SOLUTION EPIDURAL; INFILTRATION; INTRACAUDAL; PERINEURAL PRN
Status: DISCONTINUED | OUTPATIENT
Start: 2023-06-19 | End: 2023-06-19 | Stop reason: SDUPTHER

## 2023-06-19 RX ORDER — OMEPRAZOLE 20 MG/1
20 CAPSULE, DELAYED RELEASE ORAL DAILY
COMMUNITY

## 2023-06-19 RX ADMIN — LIDOCAINE HYDROCHLORIDE 100 MG: 20 INJECTION, SOLUTION EPIDURAL; INFILTRATION; INTRACAUDAL; PERINEURAL at 07:41

## 2023-06-19 RX ADMIN — SODIUM CHLORIDE: 9 INJECTION, SOLUTION INTRAVENOUS at 07:22

## 2023-06-19 RX ADMIN — PROPOFOL 150 MG: 10 INJECTION, EMULSION INTRAVENOUS at 07:41

## 2023-06-19 ASSESSMENT — PAIN - FUNCTIONAL ASSESSMENT: PAIN_FUNCTIONAL_ASSESSMENT: 0-10

## 2023-06-19 NOTE — ANESTHESIA POSTPROCEDURE EVALUATION
Department of Anesthesiology  Postprocedure Note    Patient: Micaela Garcia  MRN: [id number]  YOB: 2000  Date of evaluation: 6/19/2023      Procedure Summary     Date: 06/19/23 Room / Location: [facility] ENDO 02 / [facility] ENDOSCOPY    Anesthesia Start: 0736 Anesthesia Stop: 4803    Procedures:       EGD ESOPHAGOGASTRODUODENOSCOPY (Upper GI Region)      EGD BIOPSY (Upper GI Region) Diagnosis:       Chronic GERD      (gerd)    Surgeons: Ari Rodriguez MD Responsible Provider: Francisco J Rivera MD    Anesthesia Type: MAC ASA Status: 3          Anesthesia Type: No value filed.     Kelli Phase I: Kelli Score: 10    Kelli Phase II:        Anesthesia Post Evaluation    Patient location during evaluation: PACU  Patient participation: complete - patient participated  Level of consciousness: awake  Airway patency: patent  Nausea & Vomiting: no nausea  Complications: no  Cardiovascular status: blood pressure returned to baseline and hemodynamically stable  Respiratory status: acceptable  Hydration status: stable

## 2023-06-19 NOTE — PROGRESS NOTES

## 2023-06-19 NOTE — H&P
21 y.o. male presents for diagnostic upper endoscopy for pain, regurgitation, vomiting. Additional H&P data will be attached on the day of procedure.     Dennie Palms, MD

## 2023-06-19 NOTE — DISCHARGE INSTRUCTIONS
[facility]  [facility] - [address] [facility] or [address] [place] [address]  HOLGER Camara MD  [phone]      June 19, 2023    Claudell Oats  YOB: 2000    COLONOSCOPY DISCHARGE INSTRUCTIONS    If there is redness at IV site you should apply warm compress to area. If redness or soreness persist contact Dr. Lelia Phoenix office or your primary care doctor. There may be a slight amount of blood passed from the rectum. Gaseous discomfort may develop, but walking, belching will help relieve this. You may not operate a vehicle for 12 hours  You may not operate machinery or dangerous appliances for rest of today  You may not drink alcoholic beverages for 12 hours  Avoid making any critical decisions for 24 hours    DIET:  You may resume your normal diet, but some patients find that heavy or large meals may lead to indigestion or vomiting. I suggest a light meal as first food intake. MEDICATIONS:  The use of some over-the-counter pain medication may lead to bleeding after colon biopsies or polyp removal.  Tylenol (also called acetaminophen) is safe to take even if you have had colonoscopy with polyp removal.  Based on the procedure you had today you may safely take aspirin or aspirin-like products for the next seven (7) days. Remember that Tylenol (also called acetaminophen) is safe to take after colonoscopy even if you have had biopsies or polyps removed. ACTIVITY:  You may resume your normal household activities, but it is recommended that you spend the remainder of the day resting -  avoid any strenuous activity. CALL DR. Darby Alexander OFFICE IF:  Increasing pain, nausea, vomiting  Abdominal distension (swelling)  Significant new or increased bleeding (oral or rectal)  Fever/Chills  Chest pain/shortness of breath                       Additional instructions:   Impression: Normal upper endoscopy.  Biopsies taken from stomach to ensure no H

## 2023-06-19 NOTE — ANESTHESIA PRE PROCEDURE
Department of Anesthesiology  Preprocedure Note       Name:  Ester Weinberg   Age:  21 y.o.  :  2000                                          MRN:  [id number]         Date:  2023      Surgeon: Andrea Rees):  Bob Mejia MD    Procedure: Procedure(s):  EGD ESOPHAGOGASTRODUODENOSCOPY    Medications prior to admission:   Prior to Admission medications    Medication Sig Start Date End Date Taking? Authorizing Provider   levocetirizine (XYZAL ALLERGY 24HR) 5 MG tablet Take 1 tablet by mouth nightly   Yes Historical Provider, MD   divalproex (DEPAKOTE) 250 MG DR tablet Take 1 tablet by mouth 2 times daily   Yes Historical Provider, MD   fluticasone (FLONASE) 50 MCG/ACT nasal spray 1 spray by Each Nostril route daily   Yes Historical Provider, MD   QUEtiapine (SEROQUEL XR) 50 MG extended release tablet Take 1 tablet by mouth nightly   Yes Historical Provider, MD   omeprazole (PRILOSEC) 20 MG delayed release capsule Take 1 capsule by mouth daily   Yes Historical Provider, MD   [address] PO Take by mouth   Yes Historical Provider, MD   ARIPiprazole (ABILIFY) 5 MG tablet Take 5 mg by mouth  Patient not taking: Reported on 21   Ar Automatic Reconciliation   cetirizine (ZYRTEC) 10 MG tablet Take 10 mg by mouth daily  Patient not taking: Reported on 2023 4/10/18   Ar Automatic Reconciliation   guanFACINE (INTUNIV) 4 MG TB24 extended release tablet Take 2 mg by mouth 21   Ar Automatic Reconciliation   metFORMIN (GLUCOPHAGE) 500 MG tablet Take 500 mg by mouth 2 times daily (with meals)  Patient not taking: Reported on 2023   Ar Automatic Reconciliation   methylphenidate (CONCERTA) 54 MG extended release tablet Take 1 tablet by mouth. 21   Ar Automatic Reconciliation   Methylphenidate 54 MG CR tablet Take 54 mg by mouth.   Patient not taking: Reported on 21   Ar Automatic Reconciliation   zinc oxide (DESITIN) 40 % PSTE paste Apply topically 2

## 2023-06-19 NOTE — OP NOTE
Normal. No ulcer. Biopsies taken to assess for H pylori. Duodenum/jejunum: Normal.       Specimens:   ID Type Source Tests Collected by Time Destination   A : Biopsy gastric Rule out H. Pylori Tissue Gastric SURGICAL PATHOLOGY Nixon Fuentes MD 6/19/2023 0575         Impression: Normal upper endoscopy. Biopsies taken from stomach to ensure no H pylori. Recommendations:  - continue current medications  - await pathology results to ensure no H pylori  - has follow up arranged    Thank you for entrusting me with this patient's care. Please do not hesitate to contact me with any questions.   Signed By: Electa Hammans, MD                        June 19, 2023

## 2023-06-19 NOTE — INTERVAL H&P NOTE
Pre-Endoscopy H&P Update  Chief complaint/HPI/ROS:  The indication for the procedure, the patient's history and the patient's current medications are reviewed prior to the procedure and that data is reported on the H&P to which this document is attached. Any significant complaints with regard to organ systems will be noted, and if not mentioned then a review of systems is not contributory. Past Medical History:   Diagnosis Date    Asthma     with respiratory illness    Autism     Environmental allergies     Prediabetes     Sleep apnea       Past Surgical History:   Procedure Laterality Date    MYRINGOTOMY      12 surgical placement tubes    TONSILLECTOMY AND ADENOIDECTOMY       Social   Social History     Tobacco Use    Smoking status: Never    Smokeless tobacco: Never   Substance Use Topics    Alcohol use: Not Currently      Family History   Problem Relation Age of Onset    No Known Problems Mother     No Known Problems Father     No Known Problems Sister     Lung Cancer Maternal Grandfather     Bipolar Disorder Cousin     Other Other         Irineo Syndrome      No Known Allergies   Prior to Admission Medications   Prescriptions Last Dose Informant Patient Reported? Taking? ARIPiprazole (ABILIFY) 5 MG tablet Not Taking  Yes No   Sig: Take 5 mg by mouth   Patient not taking: Reported on 2023   FIBER ADULT GUMMIES PO 2023  Yes Yes   Sig: Take by mouth   Methylphenidate 54 MG CR tablet Not Taking  Yes No   Sig: Take 54 mg by mouth.    Patient not taking: Reported on 2023   QUEtiapine (SEROQUEL XR) 50 MG extended release tablet 2023  Yes Yes   Sig: Take 1 tablet by mouth nightly   cetirizine (ZYRTEC) 10 MG tablet Not Taking  Yes No   Sig: Take 10 mg by mouth daily   Patient not taking: Reported on 2023   divalproex (DEPAKOTE) 250 MG DR tablet 2023  Yes Yes   Sig: Take 1 tablet by mouth 2 times daily   fluticasone (FLONASE) 50 MCG/ACT nasal spray 2023  Yes Yes   Si spray by

## 2023-11-22 RX ORDER — ACETAMINOPHEN 500 MG
1000 TABLET ORAL EVERY 8 HOURS PRN
Qty: 120 TABLET | Refills: 0 | Status: SHIPPED | OUTPATIENT
Start: 2023-11-22

## 2023-11-22 RX ORDER — ACETAMINOPHEN 500 MG
1000 TABLET ORAL EVERY 8 HOURS PRN
COMMUNITY
Start: 2022-06-29 | End: 2023-11-22 | Stop reason: SDUPTHER

## 2023-11-22 NOTE — TELEPHONE ENCOUNTER
PCP: LAKHWINDER Nicolas CNP     Last appt: Visit date not found    Future Appointments   Date Time Provider 4600  [address]   1/12/2024 10:00 AM LAKHWINDER Nicolas CNP [facility] BS AMB          Requested Prescriptions     Pending Prescriptions Disp Refills    acetaminophen (TYLENOL) 500 MG tablet 120 tablet 0     Sig: Take 2 tablets by mouth every 8 hours as needed for Pain or Fever

## 2024-01-11 NOTE — PROGRESS NOTES
Trey Mcclain is a 23 y.o.yo male with Aspbergers/Autism with intellectual disability, ADHD, GERD, obesity, MEENA (uncontrolled), FLD, history of seizure x1  who presents with his mother Ruthann to establish care at [facility] for  Chief Complaint   Patient presents with    New Patient     Pt here w/mom, and states he has SOB during some ADLs       HPI  Trey Mcclain is a former patient of mine at [facility].  Prior records not provided by that office for review   [facility] notes, labs, and imaging, and specialist consult notes reviewed via care everywhere    Followd by several specialists:  GI (Eugene), nutrition (Reggie), Ortho (Moises/Allison), neuro (Bree), hepatology (Kirby), psych (Cornelia Barrett)    His primary 2 concerns today are :    Weight, prediabetes  Severe obesity with Body mass index is 46.93 kg/m².   +Hx FLD followed by Dr Orr  Works with Anitha Castellano, nutritionist  Still working on healthier diet, goes to gym several times a week with mom  In spite of lifestyle changes has gained 8 # since June 2023    He reports having trouble with shortness of breath with starting his leaf blower (pull cord) and weed eater  Denies cough, no wheezing (currently)    He would like to try \"shots\" (mom used ozempic) to augment his weight loss efforts  Trial Zepbound/Tirzepatide d/t severe obesity with serious comorbidities of prediabetes, HLD, elevated LFTs, and likely obesity hypoventilation)  No FHx MENS2 or thyroid cancer  He denies personal hx pancreatitis     He does have hx GERD with N/V.    Had EGD 6/19/2023 for GERD, vomiting - EGD WNL (Ghazala Knight)  May need to resume daily PPI with GLP1a  R/B, proper use, and SE's of medications reviewed.    Rash  Rash of  abd folds improves with desitin.  Refilled today for PRN use.    Seizure   He also has history of seizure - now followed by neurology (Bree)  EEG normal  Seizure thought isolated and risk of

## 2024-01-12 ENCOUNTER — OFFICE VISIT (OUTPATIENT)
Facility: CLINIC | Age: 24
End: 2024-01-12
Payer: MEDICAID

## 2024-01-12 VITALS
HEART RATE: 100 BPM | SYSTOLIC BLOOD PRESSURE: 130 MMHG | DIASTOLIC BLOOD PRESSURE: 77 MMHG | WEIGHT: 282 LBS | OXYGEN SATURATION: 95 % | BODY MASS INDEX: 46.93 KG/M2 | TEMPERATURE: 98 F

## 2024-01-12 DIAGNOSIS — R74.8 ELEVATED LIVER ENZYMES: ICD-10-CM

## 2024-01-12 DIAGNOSIS — E66.01 CLASS 3 SEVERE OBESITY DUE TO EXCESS CALORIES WITH SERIOUS COMORBIDITY AND BODY MASS INDEX (BMI) OF 45.0 TO 49.9 IN ADULT (HCC): Primary | ICD-10-CM

## 2024-01-12 DIAGNOSIS — E78.2 MIXED HYPERLIPIDEMIA: ICD-10-CM

## 2024-01-12 DIAGNOSIS — R21 RASH: ICD-10-CM

## 2024-01-12 DIAGNOSIS — E88.819 INSULIN RESISTANCE: ICD-10-CM

## 2024-01-12 DIAGNOSIS — R73.03 PREDIABETES: ICD-10-CM

## 2024-01-12 DIAGNOSIS — K21.9 GASTRO-ESOPHAGEAL REFLUX DISEASE WITHOUT ESOPHAGITIS: ICD-10-CM

## 2024-01-12 PROBLEM — F84.9 INTELLECTUAL DISABILITY WITH LANGUAGE IMPAIRMENT AND AUTISTIC FEATURES: Status: ACTIVE | Noted: 2017-11-20

## 2024-01-12 PROBLEM — F80.9 INTELLECTUAL DISABILITY WITH LANGUAGE IMPAIRMENT AND AUTISTIC FEATURES: Status: ACTIVE | Noted: 2017-11-20

## 2024-01-12 PROBLEM — R15.9 ENCOPRESIS: Status: ACTIVE | Noted: 2021-08-19

## 2024-01-12 PROBLEM — R15.9 FULL INCONTINENCE OF FECES: Status: ACTIVE | Noted: 2024-01-12

## 2024-01-12 PROBLEM — E66.813 CLASS 3 SEVERE OBESITY DUE TO EXCESS CALORIES WITH SERIOUS COMORBIDITY AND BODY MASS INDEX (BMI) OF 45.0 TO 49.9 IN ADULT: Status: ACTIVE | Noted: 2024-01-12

## 2024-01-12 PROBLEM — R56.9 SEIZURE (HCC): Status: ACTIVE | Noted: 2023-09-06

## 2024-01-12 PROBLEM — K76.0 FATTY LIVER: Status: ACTIVE | Noted: 2024-01-12

## 2024-01-12 PROBLEM — G47.01 INSOMNIA DUE TO MEDICAL CONDITION: Status: ACTIVE | Noted: 2017-11-20

## 2024-01-12 PROCEDURE — 99215 OFFICE O/P EST HI 40 MIN: CPT | Performed by: NURSE PRACTITIONER

## 2024-01-12 RX ORDER — DEXTROMETHORPHAN POLISTIREX 30 MG/5ML
60 SUSPENSION ORAL 2 TIMES DAILY PRN
COMMUNITY

## 2024-01-12 RX ORDER — BISMUTH SUBSALICYLATE 262 MG/1
TABLET, CHEWABLE ORAL PRN
COMMUNITY
Start: 2022-06-29

## 2024-01-12 RX ORDER — IBUPROFEN 200 MG
CAPSULE ORAL PRN
COMMUNITY
Start: 2022-06-29

## 2024-01-12 RX ORDER — HYDROXYZINE HYDROCHLORIDE 25 MG/1
1 TABLET, FILM COATED ORAL 3 TIMES DAILY PRN
COMMUNITY
Start: 2023-02-24

## 2024-01-12 RX ORDER — TRETINOIN 1 MG/G
CREAM TOPICAL PRN
COMMUNITY
Start: 2022-07-19

## 2024-01-12 RX ORDER — GUAIFENESIN 600 MG/1
600 TABLET, EXTENDED RELEASE ORAL 2 TIMES DAILY PRN
COMMUNITY

## 2024-01-12 RX ORDER — CIPROFLOXACIN AND DEXAMETHASONE 3; 1 MG/ML; MG/ML
4 SUSPENSION/ DROPS AURICULAR (OTIC) 2 TIMES DAILY PRN
COMMUNITY

## 2024-01-12 ASSESSMENT — PATIENT HEALTH QUESTIONNAIRE - PHQ9
SUM OF ALL RESPONSES TO PHQ QUESTIONS 1-9: 0
SUM OF ALL RESPONSES TO PHQ QUESTIONS 1-9: 0
2. FEELING DOWN, DEPRESSED OR HOPELESS: 0
SUM OF ALL RESPONSES TO PHQ9 QUESTIONS 1 & 2: 0
SUM OF ALL RESPONSES TO PHQ QUESTIONS 1-9: 0
1. LITTLE INTEREST OR PLEASURE IN DOING THINGS: 0
SUM OF ALL RESPONSES TO PHQ QUESTIONS 1-9: 0

## 2024-01-12 NOTE — PROGRESS NOTES
Chief Complaint   Patient presents with    New Patient     Pt here w/mom, and states he has SOB during some ADLs        /77 (Site: Right Upper Arm, Position: Sitting, Cuff Size: Large Adult)   Pulse 100   Temp 98 °F (36.7 °C) (Oral)   Wt 127.9 kg (282 lb)   SpO2 95%   BMI 46.93 kg/m²     Pain Scale: 0 - No pain/10  Pain Location:      Current Outpatient Medications on File Prior to Visit   Medication Sig Dispense Refill    bismuth subsalicylate (PEPTO BISMOL) 262 MG chewable tablet as needed      diclofenac sodium (VOLTAREN) 1 % GEL Apply 1 Application topically as needed      diphenhydrAMINE (BENADRYL) 50 MG tablet Take 1 tablet by mouth as needed      hydrOXYzine HCl (ATARAX) 25 MG tablet Take 1 tablet by mouth 3 times daily as needed      neomycin-bacitracin-polymyxin (NEOSPORIN) 3.5-400-5000 OINT ointment Apply topically as needed      tretinoin (RETIN-A) 0.1 % cream as needed      ciprofloxacin-dexAMETHasone (CIPRODEX) 0.3-0.1 % otic suspension 4 drops 2 times daily as needed      guaiFENesin (MUCINEX) 600 MG extended release tablet Take 1 tablet by mouth 2 times daily      dextromethorphan (DELSYM) 30 MG/5ML extended release liquid Take 10 mLs by mouth 2 times daily as needed for Cough      acetaminophen (TYLENOL) 500 MG tablet Take 2 tablets by mouth every 8 hours as needed for Pain or Fever 120 tablet 0    levocetirizine (XYZAL ALLERGY 24HR) 5 MG tablet Take 1 tablet by mouth as needed      divalproex (DEPAKOTE) 250 MG DR tablet Take 1 tablet by mouth 2 times daily      fluticasone (FLONASE) 50 MCG/ACT nasal spray 1 spray by Each Nostril route as needed      QUEtiapine (SEROQUEL XR) 50 MG extended release tablet Take 1 tablet by mouth 2 times daily      FIBER ADULT GUMMIES PO Take by mouth daily      ARIPiprazole (ABILIFY) 5 MG tablet Take 1 tablet by mouth      cetirizine (ZYRTEC) 10 MG tablet Take 1 tablet by mouth daily      guanFACINE (INTUNIV) 4 MG TB24 extended release tablet Take 2 mg by

## 2024-01-13 LAB
ALBUMIN SERPL-MCNC: 3.8 G/DL (ref 3.5–5)
ALBUMIN/GLOB SERPL: 1.4 (ref 1.1–2.2)
ALP SERPL-CCNC: 42 U/L (ref 45–117)
ALT SERPL-CCNC: 30 U/L (ref 12–78)
ANION GAP SERPL CALC-SCNC: 5 MMOL/L (ref 5–15)
AST SERPL-CCNC: 17 U/L (ref 15–37)
BILIRUB SERPL-MCNC: 0.6 MG/DL (ref 0.2–1)
BUN SERPL-MCNC: 12 MG/DL (ref 6–20)
BUN/CREAT SERPL: 16 (ref 12–20)
CALCIUM SERPL-MCNC: 9 MG/DL (ref 8.5–10.1)
CHLORIDE SERPL-SCNC: 105 MMOL/L (ref 97–108)
CHOLEST SERPL-MCNC: 148 MG/DL
CO2 SERPL-SCNC: 28 MMOL/L (ref 21–32)
CREAT SERPL-MCNC: 0.75 MG/DL (ref 0.7–1.3)
EST. AVERAGE GLUCOSE BLD GHB EST-MCNC: 111 MG/DL
GLOBULIN SER CALC-MCNC: 2.7 G/DL (ref 2–4)
GLUCOSE SERPL-MCNC: 94 MG/DL (ref 65–100)
HBA1C MFR BLD: 5.5 % (ref 4–5.6)
HDLC SERPL-MCNC: 33 MG/DL
HDLC SERPL: 4.5 (ref 0–5)
LDLC SERPL CALC-MCNC: 68.8 MG/DL (ref 0–100)
POTASSIUM SERPL-SCNC: 4.3 MMOL/L (ref 3.5–5.1)
PROT SERPL-MCNC: 6.5 G/DL (ref 6.4–8.2)
SODIUM SERPL-SCNC: 138 MMOL/L (ref 136–145)
TRIGL SERPL-MCNC: 231 MG/DL
VLDLC SERPL CALC-MCNC: 46.2 MG/DL

## 2024-01-13 RX ORDER — TIRZEPATIDE 2.5 MG/.5ML
INJECTION, SOLUTION SUBCUTANEOUS
Qty: 2 ML | Refills: 5 | Status: SHIPPED | OUTPATIENT
Start: 2024-01-13 | End: 2024-04-12

## 2024-01-16 ENCOUNTER — TELEPHONE (OUTPATIENT)
Facility: CLINIC | Age: 24
End: 2024-01-16

## 2024-01-16 NOTE — TELEPHONE ENCOUNTER
I called Ruthann (on HIPAA) and verified them by name and date of birth. I informed her on the message per Peggy. She stated understanding and had no further questions.

## 2024-01-16 NOTE — TELEPHONE ENCOUNTER
I called Ruthann (on HIPAA) and verified the patient by name and date of birth. He has congestion with a cough and a low grade fever. Has been taking Delysum but not getting better. Has not tested for COVID.

## 2024-01-24 ENCOUNTER — TELEPHONE (OUTPATIENT)
Facility: CLINIC | Age: 24
End: 2024-01-24

## 2024-01-25 DIAGNOSIS — R73.03 PREDIABETES: ICD-10-CM

## 2024-01-25 DIAGNOSIS — E88.819 INSULIN RESISTANCE: ICD-10-CM

## 2024-01-25 DIAGNOSIS — K21.9 GASTRO-ESOPHAGEAL REFLUX DISEASE WITHOUT ESOPHAGITIS: ICD-10-CM

## 2024-01-25 DIAGNOSIS — E66.01 CLASS 3 SEVERE OBESITY DUE TO EXCESS CALORIES WITH SERIOUS COMORBIDITY AND BODY MASS INDEX (BMI) OF 45.0 TO 49.9 IN ADULT (HCC): Primary | ICD-10-CM

## 2024-01-25 DIAGNOSIS — E78.2 MIXED HYPERLIPIDEMIA: ICD-10-CM

## 2024-01-25 DIAGNOSIS — R74.8 ELEVATED LIVER ENZYMES: ICD-10-CM

## 2024-01-25 NOTE — TELEPHONE ENCOUNTER
Amaya with [facility] called and wanted to know about medication that is requiring a PA. She stated that the alternative that was sent in also required a PA so she just needed to know what the plan was. Her number was [phone]

## 2024-01-25 NOTE — TELEPHONE ENCOUNTER
I received the denial letter from insurance company. The denial letter stated that they may consider approval of this drug in a certain situation (when you have tried and failed one of the GLP-1 weight loss medications 6 months prior to request).       I called Ruthann (on HIPAA) and verified the patient and verified them by name and date of birth. I confirmed that he has not tried any other weight loss medications. I informed her that I would speak with Peggy and see what can be done from here. I mentioned the weight loss clinic as well.

## 2024-02-06 ENCOUNTER — TELEPHONE (OUTPATIENT)
Facility: CLINIC | Age: 24
End: 2024-02-06

## 2024-02-06 NOTE — TELEPHONE ENCOUNTER
The PA for Wegovy has been denied.     I called Ruthann (on HIPAA) and verified them by name and date of birth. I informed her on the denial and suggested that she contact insurance. She has contacted them but never heard back. I suggested that we use the appointment on 02.26 to speak with Peggy about any alternatives. She will need the medication discontinued from the patients chart along with Mounjaro & Zepbound.    Where Do You Want The Question To Include Opioid Counseling Located?: Case Summary Tab

## 2024-02-12 ENCOUNTER — TELEPHONE (OUTPATIENT)
Facility: CLINIC | Age: 24
End: 2024-02-12

## 2024-02-12 NOTE — TELEPHONE ENCOUNTER
Rep with [facility] called and wanted to speak to the nurse about the the PA for araceli

## 2024-02-13 NOTE — TELEPHONE ENCOUNTER
I called [facility] and they just needed a verbal to discontinue the prescription due to denial. I informed them that it is okay to discontinue the prescription from the patients chart. They stated understanding and had no further questions.

## 2024-03-22 ENCOUNTER — OFFICE VISIT (OUTPATIENT)
Facility: CLINIC | Age: 24
End: 2024-03-22

## 2024-03-22 VITALS
TEMPERATURE: 98.4 F | WEIGHT: 282.4 LBS | SYSTOLIC BLOOD PRESSURE: 120 MMHG | RESPIRATION RATE: 16 BRPM | DIASTOLIC BLOOD PRESSURE: 82 MMHG | OXYGEN SATURATION: 96 % | HEART RATE: 102 BPM | BODY MASS INDEX: 47.05 KG/M2 | HEIGHT: 65 IN

## 2024-03-22 DIAGNOSIS — E66.2 OBESITY HYPOVENTILATION SYNDROME (HCC): ICD-10-CM

## 2024-03-22 DIAGNOSIS — M54.50 CHRONIC BILATERAL LOW BACK PAIN WITHOUT SCIATICA: ICD-10-CM

## 2024-03-22 DIAGNOSIS — G89.29 CHRONIC BILATERAL LOW BACK PAIN WITHOUT SCIATICA: ICD-10-CM

## 2024-03-22 DIAGNOSIS — B37.2 CANDIDAL INTERTRIGO: ICD-10-CM

## 2024-03-22 DIAGNOSIS — K76.0 FATTY LIVER: ICD-10-CM

## 2024-03-22 DIAGNOSIS — R06.2 WHEEZING: ICD-10-CM

## 2024-03-22 DIAGNOSIS — E66.01 CLASS 3 SEVERE OBESITY DUE TO EXCESS CALORIES WITH SERIOUS COMORBIDITY AND BODY MASS INDEX (BMI) OF 45.0 TO 49.9 IN ADULT (HCC): Primary | ICD-10-CM

## 2024-03-22 RX ORDER — ALBUTEROL SULFATE 90 UG/1
2 AEROSOL, METERED RESPIRATORY (INHALATION) 4 TIMES DAILY PRN
Qty: 18 G | Refills: 5 | Status: SHIPPED | OUTPATIENT
Start: 2024-03-22 | End: 2024-03-22

## 2024-03-22 RX ORDER — METHYLPHENIDATE HYDROCHLORIDE 54 MG/1
TABLET, EXTENDED RELEASE ORAL
COMMUNITY
Start: 2024-01-22

## 2024-03-22 RX ORDER — IBUPROFEN 600 MG/1
600 TABLET ORAL EVERY 8 HOURS PRN
COMMUNITY
Start: 2024-02-02

## 2024-03-22 RX ORDER — QUETIAPINE FUMARATE 50 MG/1
TABLET, FILM COATED ORAL
COMMUNITY
Start: 2024-02-15

## 2024-03-22 RX ORDER — GUANFACINE 2 MG/1
TABLET, EXTENDED RELEASE ORAL
COMMUNITY
Start: 2024-02-15

## 2024-03-22 RX ORDER — ALBUTEROL SULFATE 90 UG/1
2 AEROSOL, METERED RESPIRATORY (INHALATION) 4 TIMES DAILY PRN
Qty: 18 G | Refills: 5 | Status: SHIPPED | OUTPATIENT
Start: 2024-03-22

## 2024-03-22 RX ORDER — ALBUTEROL SULFATE 90 UG/1
2 AEROSOL, METERED RESPIRATORY (INHALATION) EVERY 6 HOURS PRN
COMMUNITY
Start: 2024-01-17

## 2024-03-22 ASSESSMENT — PATIENT HEALTH QUESTIONNAIRE - PHQ9
1. LITTLE INTEREST OR PLEASURE IN DOING THINGS: NOT AT ALL
SUM OF ALL RESPONSES TO PHQ QUESTIONS 1-9: 0
2. FEELING DOWN, DEPRESSED OR HOPELESS: NOT AT ALL
SUM OF ALL RESPONSES TO PHQ QUESTIONS 1-9: 0
SUM OF ALL RESPONSES TO PHQ9 QUESTIONS 1 & 2: 0

## 2024-03-22 NOTE — PROGRESS NOTES
Trey Mcclain is a 24 y.o. male    Chief Complaint   Patient presents with    Follow-up     6 week follow up  Weight check       /82   Pulse (!) 102   Temp 98.4 °F (36.9 °C)   Resp 16   Ht 1.651 m (5' 5\")   Wt 128.1 kg (282 lb 6.4 oz)   SpO2 96%   BMI 46.99 kg/m²         1. Have you been to the ER, urgent care clinic since your last visit?  Hospitalized since your last visit? No    2. Have you seen or consulted any other health care providers outside of the [facility] since your last visit?  Include any pap smears or colon screening. No    Learning Assessment:       No data to display                Fall Risk Assessment:       No data to display                Abuse Screening:       No data to display                ADL Screening:       No data to display                
2 times daily as needed for Cough      zinc oxide (DESITIN) 40 % PSTE paste Apply topically 2 times daily as needed for rash 453 g 0    acetaminophen (TYLENOL) 500 MG tablet Take 2 tablets by mouth every 8 hours as needed for Pain or Fever 120 tablet 0    levocetirizine (XYZAL ALLERGY 24HR) 5 MG tablet Take 1 tablet by mouth as needed      divalproex (DEPAKOTE) 250 MG DR tablet Take 1 tablet by mouth 2 times daily      fluticasone (FLONASE) 50 MCG/ACT nasal spray 1 spray by Each Nostril route as needed      FIBER ADULT GUMMIES PO Take by mouth daily         No Known Allergies    ASSESSMENT & PLAN  See HPI  1. Class 3 severe obesity due to excess calories with serious comorbidity and body mass index (BMI) of 45.0 to 49.9 in adult (HCC)  -     Tirzepatide (MOUNJARO) 2.5 MG/0.5ML SOPN SC injection; Inject 0.5 mLs into the skin once a week, Disp-2 mL, R-5Print  2. Candidal intertrigo  Overview:  Resolves with desitin + azole use  3. Chronic bilateral low back pain without sciatica  -     diclofenac sodium (VOLTAREN) 1 % GEL; Apply 4 g topically 4 times daily, Topical, 4 TIMES DAILY Starting Fri 3/22/2024, Disp-100 g, R-0, Print  4. Wheezing  -     albuterol sulfate HFA (VENTOLIN HFA) 108 (90 Base) MCG/ACT inhaler; Inhale 2 puffs into the lungs 4 times daily as needed for Wheezing, Disp-18 g, R-5Print  5. Obesity hypoventilation syndrome (HCC)  -     Tirzepatide (MOUNJARO) 2.5 MG/0.5ML SOPN SC injection; Inject 0.5 mLs into the skin once a week, Disp-2 mL, R-5Print  6. Fatty liver  -     Tirzepatide (MOUNJARO) 2.5 MG/0.5ML SOPN SC injection; Inject 0.5 mLs into the skin once a week, Disp-2 mL, R-5Print       Paper work completed, letter of support for mother to continue full time care completed today.    Return in about 6 weeks (around 4/30/2024) for Annual Physical.     LAKHWINDER Camilo - CNP     Collaborating physician: Anna Aiken MD      Patient Care Team:  Peggy Garcia APRN - CNP as PCP - General (Nurse

## 2024-04-01 ENCOUNTER — TELEMEDICINE (OUTPATIENT)
Facility: CLINIC | Age: 24
End: 2024-04-01
Payer: MEDICAID

## 2024-04-01 DIAGNOSIS — J06.9 VIRAL UPPER RESPIRATORY TRACT INFECTION: Primary | ICD-10-CM

## 2024-04-01 PROCEDURE — 99213 OFFICE O/P EST LOW 20 MIN: CPT | Performed by: NURSE PRACTITIONER

## 2024-04-01 RX ORDER — DIPHENHYDRAMINE HCL 25 MG
1 CAPSULE ORAL EVERY 6 HOURS PRN
Qty: 24 TABLET | Refills: 1 | Status: SHIPPED | OUTPATIENT
Start: 2024-04-01 | End: 2024-04-01

## 2024-04-01 RX ORDER — DIPHENHYDRAMINE HCL 25 MG
1 CAPSULE ORAL EVERY 6 HOURS PRN
Qty: 24 TABLET | Refills: 1 | Status: SHIPPED | OUTPATIENT
Start: 2024-04-01 | End: 2024-04-08

## 2024-04-01 RX ORDER — METHYLPREDNISOLONE 4 MG/1
TABLET ORAL
Qty: 21 TABLET | Refills: 0 | Status: SHIPPED | OUTPATIENT
Start: 2024-04-01 | End: 2024-04-07

## 2024-04-01 RX ORDER — DIPHENHYDRAMINE HCL 25 MG
1 CAPSULE ORAL EVERY 6 HOURS PRN
Qty: 24 TABLET | Refills: 1 | Status: SHIPPED | OUTPATIENT
Start: 2024-04-01 | End: 2024-04-01 | Stop reason: SDUPTHER

## 2024-04-01 RX ORDER — METHYLPREDNISOLONE 4 MG/1
TABLET ORAL
Qty: 21 TABLET | Refills: 0 | Status: SHIPPED | OUTPATIENT
Start: 2024-04-01 | End: 2024-04-01

## 2024-04-01 NOTE — PROGRESS NOTES
HPI    PHYSICAL EXAMINATION:  Vital Signs: (As obtained by patient/caregiver at home)  There were no vitals taken for this visit.     Constitutional: [x] Appears well-developed and well-nourished [x] No apparent distress      [] Abnormal -     Mental status: [x] Alert and awake  [x] Oriented to person/place/time [x] Able to follow commands    [] Abnormal -     Eyes:   EOM    [x]  Normal    [] Abnormal -   Sclera  [x]  Normal    [] Abnormal -          Discharge [x]  None visible   [] Abnormal -     HENT: [x] Normocephalic, atraumatic  [] Abnormal -   [x] Mouth/Throat: Mucous membranes are moist    External Ears [x] Normal  [] Abnormal -    Neck: [x] No visualized mass [] Abnormal -     Pulmonary/Chest: [][x] Respiratory effort normal   [x] No visualized signs of difficulty breathing or respiratory distress         Abnormal - coughing - productive     Musculoskeletal:   [x] Normal gait with no signs of ataxia         [x] Normal range of motion of neck        [] Abnormal -     Neurological:        [x] No Facial Asymmetry (Cranial nerve 7 motor function) (limited exam due to video visit)          [x] No gaze palsy        [] Abnormal -          Skin:        [x] No significant exanthematous lesions or discoloration noted on facial skin         [] Abnormal -            Psychiatric:       [x] Normal Affect [] Abnormal -        [x] No Hallucinations    Other pertinent observable physical exam findings:-    Assessment & Plan:     1. Viral upper respiratory tract infection  -     [facility] DAYQUIL SEVERE COLD/FLU 5--325 MG TABS; Take 1 capsule by mouth every 6 hours as needed (for severe cold and flu symptoms), Disp-24 tablet, R-1, DAWPrint  -     methylPREDNISolone (MEDROL DOSEPACK) 4 MG tablet; Take by mouth., Disp-21 tablet, R-0Print       R/B, proper use, and SE's of medications reviewed.  Patient advised to return to clinic or UC/ER (if after hours/weekend) if symptoms are not improving or are worsening.        We

## 2024-04-05 NOTE — TELEPHONE ENCOUNTER
PCP: Peggy Garcia APRN - CNP     Last appt:  4/1/2024      Future Appointments   Date Time Provider Department Center   4/30/2024  9:40 AM Peggy Garcia APRN - CNP [facility]          Requested Prescriptions     Pending Prescriptions Disp Refills    levocetirizine (XYZAL ALLERGY 24HR) 5 MG tablet 90 tablet 3     Sig: Take 1 tablet by mouth as needed

## 2024-04-08 RX ORDER — LEVOCETIRIZINE DIHYDROCHLORIDE 5 MG/1
5 TABLET, FILM COATED ORAL NIGHTLY
Qty: 90 TABLET | Refills: 3 | Status: SHIPPED | OUTPATIENT
Start: 2024-04-08

## 2024-04-30 ENCOUNTER — OFFICE VISIT (OUTPATIENT)
Facility: CLINIC | Age: 24
End: 2024-04-30
Payer: MEDICAID

## 2024-04-30 VITALS
TEMPERATURE: 97.6 F | SYSTOLIC BLOOD PRESSURE: 94 MMHG | RESPIRATION RATE: 16 BRPM | BODY MASS INDEX: 45.42 KG/M2 | HEIGHT: 65 IN | DIASTOLIC BLOOD PRESSURE: 61 MMHG | WEIGHT: 272.6 LBS | OXYGEN SATURATION: 95 % | HEART RATE: 100 BPM

## 2024-04-30 DIAGNOSIS — Z11.1 SCREENING-PULMONARY TB: ICD-10-CM

## 2024-04-30 DIAGNOSIS — R30.0 DYSURIA: ICD-10-CM

## 2024-04-30 DIAGNOSIS — E66.2 OBESITY HYPOVENTILATION SYNDROME (HCC): ICD-10-CM

## 2024-04-30 DIAGNOSIS — E66.01 CLASS 3 SEVERE OBESITY DUE TO EXCESS CALORIES WITH SERIOUS COMORBIDITY AND BODY MASS INDEX (BMI) OF 45.0 TO 49.9 IN ADULT (HCC): ICD-10-CM

## 2024-04-30 DIAGNOSIS — Z11.59 ENCOUNTER FOR HEPATITIS C SCREENING TEST FOR LOW RISK PATIENT: ICD-10-CM

## 2024-04-30 DIAGNOSIS — K76.0 FATTY LIVER: ICD-10-CM

## 2024-04-30 DIAGNOSIS — Z00.00 ROUTINE GENERAL MEDICAL EXAMINATION AT A HEALTH CARE FACILITY: Primary | ICD-10-CM

## 2024-04-30 PROCEDURE — 99395 PREV VISIT EST AGE 18-39: CPT | Performed by: NURSE PRACTITIONER

## 2024-04-30 NOTE — PROGRESS NOTES
Trey Mcclain is a 24 y.o. male    Chief Complaint   Patient presents with    Annual Exam       BP 94/61   Pulse (!) 109   Temp 97.6 °F (36.4 °C)   Resp 16   Ht 1.651 m (5' 5\")   Wt 123.7 kg (272 lb 9.6 oz)   SpO2 95%   BMI 45.36 kg/m²         1. Have you been to the ER, urgent care clinic since your last visit?  Hospitalized since your last visit? No    2. Have you seen or consulted any other health care providers outside of the [facility] since your last visit?  Include any pap smears or colon screening. No    Learning Assessment:       No data to display                Fall Risk Assessment:       No data to display                Abuse Screening:       No data to display                ADL Screening:       No data to display                
dysphoria, confusion, sleep disturbance.            3/22/2024    10:05 AM 1/12/2024    10:10 AM   PHQ Scores   PHQ2 Score 0 0   PHQ9 Score 0 0     Interpretation of Total Score Depression Severity: 1-4 = Minimal depression, 5-9 = Mild depression, 10-14 = Moderate depression, 15-19 = Moderately severe depression, 20-27 = Severe depression          No data to display                 Physical Exam   GENERAL: Patient appears well nourished, well developed, obese. In no acute distress.  EYES: Sclera anicteric, conjunctiva not injected.  EOMI grossly.  EARS: Hearing intact to spoken voice.  No gross outer ear deformity.  Bilateral tympanic membranes pearly gray.  OROPHARYNX: macroglossia noted. Palate unremarkable.  No OP erythema or exudate.  NECK: Appears normal.  Supple.  No lymphadenopathy.  No thyromegaly.  RESPIRATORY: Clear to auscultation bilaterally.  Normal inspiratory to expiratory ratio.  Normal rate and excursion.  No adventitious breath sounds.  CARDIAC: Regular rate and rhythm.   No murmur, rub, or gallop noted. No peripheral edema noted.  SKIN: erythematous rash of abdomen noted  MUSCULOSKELETAL: Appears grossly normal.  No abnormalities noted.  Upper and lower extremity strength 5/5. No gross joint or muscle swelling or deformity.    NEUROLOGICAL:   Alert, easily engaged.  Gait normal.  Movements grossly normal.    PSYCHIATRIC:  Affect full range today..  Behavior is cooperative, though continues to appear relatively impulsive.  Mood appears to be good today.    Thought content does not include homicidal or suicidal ideation. Thought content does not include suicidal plan.   HEMATOLOGIC / LYMPHATIC - No gross bruising; No significant LAD.      Past Medical History:   Diagnosis Date    Acne     ADHD     Asperger syndrome     Asthma     with respiratory illness    Autism     Encopresis     Environmental allergies     Insomnia     Nocturnal enuresis     Prediabetes     Sleep apnea       Past Surgical

## 2024-05-01 LAB
ALBUMIN SERPL-MCNC: 4.5 G/DL (ref 4.3–5.2)
ALBUMIN/GLOB SERPL: 2.4 {RATIO} (ref 1.2–2.2)
ALP SERPL-CCNC: 41 IU/L (ref 44–121)
ALT SERPL-CCNC: 25 IU/L (ref 0–44)
APPEARANCE UR: CLEAR
AST SERPL-CCNC: 23 IU/L (ref 0–40)
BASOPHILS # BLD AUTO: 0 X10E3/UL (ref 0–0.2)
BASOPHILS NFR BLD AUTO: 1 %
BILIRUB SERPL-MCNC: 0.3 MG/DL (ref 0–1.2)
BILIRUB UR QL STRIP: NEGATIVE
BUN SERPL-MCNC: 9 MG/DL (ref 6–20)
BUN/CREAT SERPL: 12 (ref 9–20)
CALCIUM SERPL-MCNC: 9.2 MG/DL (ref 8.7–10.2)
CHLORIDE SERPL-SCNC: 104 MMOL/L (ref 96–106)
CO2 SERPL-SCNC: 20 MMOL/L (ref 20–29)
COLOR UR: YELLOW
CREAT SERPL-MCNC: 0.76 MG/DL (ref 0.76–1.27)
EGFRCR SERPLBLD CKD-EPI 2021: 129 ML/MIN/1.73
EOSINOPHIL # BLD AUTO: 0.2 X10E3/UL (ref 0–0.4)
EOSINOPHIL NFR BLD AUTO: 4 %
ERYTHROCYTE [DISTWIDTH] IN BLOOD BY AUTOMATED COUNT: 15 % (ref 11.6–15.4)
GLOBULIN SER CALC-MCNC: 1.9 G/DL (ref 1.5–4.5)
GLUCOSE SERPL-MCNC: 100 MG/DL (ref 70–99)
GLUCOSE UR QL STRIP: NEGATIVE
HCT VFR BLD AUTO: 40 % (ref 37.5–51)
HCV IGG SERPL QL IA: NON REACTIVE
HGB BLD-MCNC: 13.1 G/DL (ref 13–17.7)
HGB UR QL STRIP: NEGATIVE
IMM GRANULOCYTES # BLD AUTO: 0 X10E3/UL (ref 0–0.1)
KETONES UR QL STRIP: ABNORMAL
LEUKOCYTE ESTERASE UR QL STRIP: NEGATIVE
LYMPHOCYTES # BLD AUTO: 2.1 X10E3/UL (ref 0.7–3.1)
LYMPHOCYTES NFR BLD AUTO: 47 %
MCH RBC QN AUTO: 28.4 PG (ref 26.6–33)
MCHC RBC AUTO-ENTMCNC: 32.8 G/DL (ref 31.5–35.7)
MCV RBC AUTO: 87 FL (ref 79–97)
MONOCYTES # BLD AUTO: 0.4 X10E3/UL (ref 0.1–0.9)
MONOCYTES NFR BLD AUTO: 10 %
NEUTROPHILS # BLD AUTO: 1.6 X10E3/UL (ref 1.4–7)
NEUTROPHILS NFR BLD AUTO: 37 %
NITRITE UR QL STRIP: NEGATIVE
PH UR STRIP: 6.5 [PH] (ref 5–7.5)
PLATELET # BLD AUTO: 305 X10E3/UL (ref 150–450)
POTASSIUM SERPL-SCNC: 4.7 MMOL/L (ref 3.5–5.2)
PROT SERPL-MCNC: 6.4 G/DL (ref 6–8.5)
PROT UR QL STRIP: NEGATIVE
RBC # BLD AUTO: 4.62 X10E6/UL (ref 4.14–5.8)
SODIUM SERPL-SCNC: 143 MMOL/L (ref 134–144)
SP GR UR STRIP: 1.02 (ref 1–1.03)
T4 FREE SERPL-MCNC: 1.09 NG/DL (ref 0.82–1.77)
TSH SERPL DL<=0.005 MIU/L-ACNC: 3.18 UIU/ML (ref 0.45–4.5)
UROBILINOGEN UR STRIP-MCNC: 0.2 MG/DL (ref 0.2–1)
WBC # BLD AUTO: 4.4 X10E3/UL (ref 3.4–10.8)

## 2024-05-02 ENCOUNTER — TELEPHONE (OUTPATIENT)
Facility: CLINIC | Age: 24
End: 2024-05-02

## 2024-05-02 NOTE — TELEPHONE ENCOUNTER
Pt mother needs a letter stating Tirzepatide (MOUNJARO) 5 MG/0.5ML SOPN SC injection has been discontinued for pt

## 2024-05-07 LAB
GAMMA INTERFERON BACKGROUND BLD IA-ACNC: 0.09 IU/ML
M TB IFN-G BLD-IMP: NEGATIVE
M TB IFN-G CD4+ BCKGRND COR BLD-ACNC: 0.14 IU/ML
M TB IFN-G CD4+CD8+ BCKGRND COR BLD-ACNC: 0.12 IU/ML
MITOGEN IGNF BCKGRD COR BLD-ACNC: >10 IU/ML
SERVICE CMNT-IMP: NORMAL

## 2024-05-30 PROBLEM — Z00.00 ROUTINE GENERAL MEDICAL EXAMINATION AT A HEALTH CARE FACILITY: Status: RESOLVED | Noted: 2024-04-30 | Resolved: 2024-05-30

## 2024-08-16 ENCOUNTER — OFFICE VISIT (OUTPATIENT)
Facility: CLINIC | Age: 24
End: 2024-08-16
Payer: MEDICAID

## 2024-08-16 VITALS
SYSTOLIC BLOOD PRESSURE: 93 MMHG | DIASTOLIC BLOOD PRESSURE: 66 MMHG | BODY MASS INDEX: 43.72 KG/M2 | HEIGHT: 65 IN | OXYGEN SATURATION: 97 % | WEIGHT: 262.4 LBS | HEART RATE: 94 BPM

## 2024-08-16 DIAGNOSIS — F80.9 INTELLECTUAL DISABILITY WITH LANGUAGE IMPAIRMENT AND AUTISTIC FEATURES: Primary | ICD-10-CM

## 2024-08-16 DIAGNOSIS — F84.9 INTELLECTUAL DISABILITY WITH LANGUAGE IMPAIRMENT AND AUTISTIC FEATURES: Primary | ICD-10-CM

## 2024-08-16 DIAGNOSIS — Z87.898 HISTORY OF SEIZURE: ICD-10-CM

## 2024-08-16 PROCEDURE — 99214 OFFICE O/P EST MOD 30 MIN: CPT | Performed by: NURSE PRACTITIONER

## 2024-08-16 SDOH — ECONOMIC STABILITY: FOOD INSECURITY: WITHIN THE PAST 12 MONTHS, THE FOOD YOU BOUGHT JUST DIDN'T LAST AND YOU DIDN'T HAVE MONEY TO GET MORE.: NEVER TRUE

## 2024-08-16 SDOH — ECONOMIC STABILITY: INCOME INSECURITY: HOW HARD IS IT FOR YOU TO PAY FOR THE VERY BASICS LIKE FOOD, HOUSING, MEDICAL CARE, AND HEATING?: NOT VERY HARD

## 2024-08-16 SDOH — ECONOMIC STABILITY: FOOD INSECURITY: WITHIN THE PAST 12 MONTHS, YOU WORRIED THAT YOUR FOOD WOULD RUN OUT BEFORE YOU GOT MONEY TO BUY MORE.: NEVER TRUE

## 2024-08-16 ASSESSMENT — VISUAL ACUITY
OS_CC: 20/20
OD_CC: 20/20

## 2024-08-16 NOTE — PROGRESS NOTES
Trey Mcclain is a 24 y.o.yo male with Aspbergers/Autism with intellectual disability, ADHD, GERD, obesity, MEENA (uncontrolled), FLD, history of seizure x1  who presents with his mother Ruthann for physical and paperwork for [employer]    Chief Complaint   Patient presents with    Forms       HPI    Trey presents with his mother for pre-participation exam and paperwork.    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    Encouraged to follow up with neurology for hx of seizure like activity (one isolated event, no recurrence).    Paperwork completed, scanned to chart, handed to patient at checkout      Vitals:    08/16/24 1050   BP: 93/66   Site: Right Upper Arm   Position: Sitting   Cuff Size: Large Adult   Pulse: 94   SpO2: 97%   Weight: 119 kg (262 lb 6.4 oz)   Height: 1.651 m (5' 5\")        Wt Readings from Last 3 Encounters:   08/16/24 119 kg (262 lb 6.4 oz)   04/30/24 123.7 kg (272 lb 9.6 oz)   03/22/24 128.1 kg (282 lb 6.4 oz)      Body mass index is 43.67 kg/m².     Review of Systems   Constitutional: Negative for headache, fever, and unexpected weight change.  Eyes: Negative for visual changes.  Respiratory: Negative for shortness of breath, wheezing.  Cardiovascular: Negative for chest pain, palpitations, shortness of breath, and leg swelling.  GI/: No changes in bowels or bladder.    Musculoskeletal: No joint pain or swelling.  Skin: No rashes, skin changes  Neurological: Negative for dizziness, weakness, light-headedness.  Hematological: Negative for adenopathy. Does not bruise/bleed easily.   Psychiatric: Negative for anxiety, dysphoria, confusion, sleep disturbance.              3/22/2024    10:05 AM 1/12/2024    10:10 AM   PHQ Scores   PHQ2 Score 0 0   PHQ9 Score 0 0     Interpretation of Total Score Depression Severity: 1-4 = Minimal depression, 5-9 =

## 2024-08-18 PROBLEM — R56.9 SEIZURE (HCC): Status: RESOLVED | Noted: 2023-09-06 | Resolved: 2024-08-18

## 2024-08-18 PROBLEM — Z87.898 HISTORY OF SEIZURE: Status: ACTIVE | Noted: 2024-08-18

## 2024-08-22 ENCOUNTER — TELEPHONE (OUTPATIENT)
Facility: CLINIC | Age: 24
End: 2024-08-22

## 2024-08-22 NOTE — TELEPHONE ENCOUNTER
Ms Mcclain call and need a hard copy of flonaze  and mucinex 600 please call her to see exactly that she is needing.

## 2024-08-23 NOTE — TELEPHONE ENCOUNTER
Future Appointments:  No future appointments.     Last Appointment With Me:  2024     Requested Prescriptions     Pending Prescriptions Disp Refills    fluticasone (FLONASE) 50 MCG/ACT nasal spray 16 g 3     Si spray by Each Nostril route daily    guaiFENesin (MUCINEX) 600 MG extended release tablet  3     Sig: Take 1 tablet by mouth 2 times daily as needed for Congestion

## 2024-08-24 RX ORDER — GUAIFENESIN 600 MG/1
600 TABLET, EXTENDED RELEASE ORAL 2 TIMES DAILY PRN
Qty: 60 TABLET | Refills: 3 | Status: SHIPPED | OUTPATIENT
Start: 2024-08-24

## 2024-08-24 RX ORDER — FLUTICASONE PROPIONATE 50 MCG
1 SPRAY, SUSPENSION (ML) NASAL DAILY
Qty: 1 EACH | Refills: 11 | Status: SHIPPED | OUTPATIENT
Start: 2024-08-24

## 2024-10-18 DIAGNOSIS — E66.01 CLASS 3 SEVERE OBESITY DUE TO EXCESS CALORIES WITH SERIOUS COMORBIDITY AND BODY MASS INDEX (BMI) OF 45.0 TO 49.9 IN ADULT: ICD-10-CM

## 2024-10-18 DIAGNOSIS — E66.813 CLASS 3 SEVERE OBESITY DUE TO EXCESS CALORIES WITH SERIOUS COMORBIDITY AND BODY MASS INDEX (BMI) OF 45.0 TO 49.9 IN ADULT: ICD-10-CM

## 2024-10-18 NOTE — TELEPHONE ENCOUNTER
Future Appointments:  No future appointments.     Last Appointment With Me:  8/16/2024     Requested Prescriptions     Pending Prescriptions Disp Refills    MOUNJARO 5 MG/0.5ML SOPN SC injection [Pharmacy Med Name: MOUNJARO 5MG/0.5ML SOLUTION AUTO-INJECTOR] 2 mL 5     Sig: INJECT 0.5 MLS (5 MG) INTO THE SKIN ONCE A WEEK

## 2024-10-19 RX ORDER — TIRZEPATIDE 5 MG/.5ML
INJECTION, SOLUTION SUBCUTANEOUS
Qty: 2 ML | Refills: 5 | Status: SHIPPED | OUTPATIENT
Start: 2024-10-19

## 2024-11-12 ENCOUNTER — PATIENT MESSAGE (OUTPATIENT)
Facility: CLINIC | Age: 24
End: 2024-11-12

## 2024-11-12 NOTE — TELEPHONE ENCOUNTER
Future Appointments:  No future appointments.     Last Appointment With Me:  8/16/2024     Requested Prescriptions     Pending Prescriptions Disp Refills    Tirzepatide (MOUNJARO) 5 MG/0.5ML SOAJ 5 mL 1     Sig: Inject 5 mg into the skin once a week       Good morning,   Trey need a refill on his mounjaro injection. 5mg He has finished sport can we increase he amount. Trey has one dose left on this prescription.   Thank you

## 2024-11-13 DIAGNOSIS — E66.01 CLASS 3 SEVERE OBESITY DUE TO EXCESS CALORIES WITH SERIOUS COMORBIDITY AND BODY MASS INDEX (BMI) OF 45.0 TO 49.9 IN ADULT: Primary | ICD-10-CM

## 2024-11-13 DIAGNOSIS — K76.0 FATTY LIVER: ICD-10-CM

## 2024-11-13 DIAGNOSIS — E66.813 CLASS 3 SEVERE OBESITY DUE TO EXCESS CALORIES WITH SERIOUS COMORBIDITY AND BODY MASS INDEX (BMI) OF 45.0 TO 49.9 IN ADULT: Primary | ICD-10-CM

## 2024-11-13 RX ORDER — TIRZEPATIDE 5 MG/.5ML
5 INJECTION, SOLUTION SUBCUTANEOUS WEEKLY
Qty: 2 ML | Refills: 5 | OUTPATIENT
Start: 2024-11-13

## 2024-11-13 RX ORDER — TIRZEPATIDE 5 MG/.5ML
5 INJECTION, SOLUTION SUBCUTANEOUS WEEKLY
Qty: 2 ML | Refills: 5 | Status: SHIPPED | OUTPATIENT
Start: 2024-11-13 | End: 2024-11-13

## 2025-01-30 ENCOUNTER — TELEPHONE (OUTPATIENT)
Facility: CLINIC | Age: 25
End: 2025-01-30

## 2025-01-30 NOTE — TELEPHONE ENCOUNTER
The Tirzepatide 7.5 MG/0.5ML SOAJ  pt has maintained weight and not lost anything pt is ready to move up to the 10 mg

## 2025-01-31 ENCOUNTER — TELEPHONE (OUTPATIENT)
Facility: CLINIC | Age: 25
End: 2025-01-31

## 2025-01-31 NOTE — TELEPHONE ENCOUNTER
I called and spoke to Ruthann Mcclain. She will drop off the forms and Peggy can fill me out upon her return. She verbalized understanding and had no further questions.

## 2025-01-31 NOTE — TELEPHONE ENCOUNTER
Pt mother called stating she is trying to get pt into a program for special needs and they require a letter confirming pt takes tylenol for headache. She also says she needs some forms filled out and wants to get pt in the program before pcp next availability and would like to know if pt can see someone else

## 2025-02-13 ENCOUNTER — TELEPHONE (OUTPATIENT)
Facility: CLINIC | Age: 25
End: 2025-02-13

## 2025-02-14 NOTE — TELEPHONE ENCOUNTER
Pt mom (on HIPAA) is asking if we have gotten a fax from [employer] ([phone] phone number to call if needed) for the blood pressure equipment it was addressed to  being that Peggy is out. I explained to her that I will check with dr. Burch and GINGER her nurse.

## 2025-02-25 ENCOUNTER — OFFICE VISIT (OUTPATIENT)
Facility: CLINIC | Age: 25
End: 2025-02-25
Payer: MEDICAID

## 2025-02-25 VITALS
DIASTOLIC BLOOD PRESSURE: 80 MMHG | BODY MASS INDEX: 44.75 KG/M2 | HEIGHT: 65 IN | SYSTOLIC BLOOD PRESSURE: 120 MMHG | HEART RATE: 97 BPM | WEIGHT: 268.6 LBS | TEMPERATURE: 97.8 F | OXYGEN SATURATION: 99 %

## 2025-02-25 DIAGNOSIS — E66.813 CLASS 3 SEVERE OBESITY DUE TO EXCESS CALORIES WITH SERIOUS COMORBIDITY AND BODY MASS INDEX (BMI) OF 40.0 TO 44.9 IN ADULT: Primary | ICD-10-CM

## 2025-02-25 DIAGNOSIS — K76.0 FATTY LIVER: ICD-10-CM

## 2025-02-25 DIAGNOSIS — R73.09 BLOOD GLUCOSE ABNORMAL: ICD-10-CM

## 2025-02-25 DIAGNOSIS — K21.9 GASTRO-ESOPHAGEAL REFLUX DISEASE WITHOUT ESOPHAGITIS: ICD-10-CM

## 2025-02-25 DIAGNOSIS — F84.0 AUTISTIC DISORDER: ICD-10-CM

## 2025-02-25 DIAGNOSIS — R15.9 ENCOPRESIS: ICD-10-CM

## 2025-02-25 DIAGNOSIS — E66.01 CLASS 3 SEVERE OBESITY DUE TO EXCESS CALORIES WITH SERIOUS COMORBIDITY AND BODY MASS INDEX (BMI) OF 40.0 TO 44.9 IN ADULT: Primary | ICD-10-CM

## 2025-02-25 DIAGNOSIS — F90.2 ADHD (ATTENTION DEFICIT HYPERACTIVITY DISORDER), COMBINED TYPE: ICD-10-CM

## 2025-02-25 PROCEDURE — 99214 OFFICE O/P EST MOD 30 MIN: CPT | Performed by: NURSE PRACTITIONER

## 2025-02-25 RX ORDER — ATOMOXETINE 40 MG/1
40 CAPSULE ORAL DAILY
COMMUNITY
Start: 2025-01-15

## 2025-02-25 RX ORDER — SODIUM FLUORIDE 5 MG/G
GEL, DENTIFRICE DENTAL
COMMUNITY
Start: 2025-01-14

## 2025-02-25 SDOH — ECONOMIC STABILITY: FOOD INSECURITY: WITHIN THE PAST 12 MONTHS, THE FOOD YOU BOUGHT JUST DIDN'T LAST AND YOU DIDN'T HAVE MONEY TO GET MORE.: NEVER TRUE

## 2025-02-25 SDOH — ECONOMIC STABILITY: FOOD INSECURITY: WITHIN THE PAST 12 MONTHS, YOU WORRIED THAT YOUR FOOD WOULD RUN OUT BEFORE YOU GOT MONEY TO BUY MORE.: NEVER TRUE

## 2025-02-25 ASSESSMENT — PATIENT HEALTH QUESTIONNAIRE - PHQ9
SUM OF ALL RESPONSES TO PHQ QUESTIONS 1-9: 2
2. FEELING DOWN, DEPRESSED OR HOPELESS: SEVERAL DAYS
SUM OF ALL RESPONSES TO PHQ QUESTIONS 1-9: 2
SUM OF ALL RESPONSES TO PHQ QUESTIONS 1-9: 2
SUM OF ALL RESPONSES TO PHQ9 QUESTIONS 1 & 2: 2
SUM OF ALL RESPONSES TO PHQ QUESTIONS 1-9: 2
1. LITTLE INTEREST OR PLEASURE IN DOING THINGS: SEVERAL DAYS

## 2025-02-25 NOTE — PROGRESS NOTES
Trey Mcclain  Identified pt with two pt identifiers(name and ).  Chief Complaint   Patient presents with    Discuss Medications    Medication Refill       1. Have you been to the ER, urgent care clinic since your last visit?  Hospitalized since your last visit? NO    2. Have you seen or consulted any other health care providers outside of the [facility] since your last visit?  Include any pap smears or colon screening. NO    Patient does not have an advance directive.    Vitals reviewed with provider.    Health Maintenance reviewed:     Health Maintenance Due   Topic    Flu vaccine (1)    COVID-19 Vaccine ( season)    Depression Screen           Wt Readings from Last 3 Encounters:   25 121.8 kg (268 lb 9.6 oz)   24 119 kg (262 lb 6.4 oz)   24 123.7 kg (272 lb 9.6 oz)        Temp Readings from Last 3 Encounters:   24 97.6 °F (36.4 °C)   24 98.4 °F (36.9 °C)        BP Readings from Last 3 Encounters:   24 93/66   24 94/61   24 120/82        Pulse Readings from Last 3 Encounters:   24 94   24 100   24 (!) 102             No data to display                  Learning Assessment:          No data to display                  Fall Risk Assessment:   :          No data to display                Abuse Screening:   :          No data to display                   ADL Screening:   :          No data to display                
    Interpretation of Total Score Depression Severity: 1-4 = Minimal depression, 5-9 = Mild depression, 10-14 = Moderate depression, 15-19 = Moderately severe depression, 20-27 = Severe depression          No data to display                 Physical Exam   GENERAL: Patient appears well nourished, well developed, obese.  In no acute distress.  EYES: Sclera anicteric, conjunctiva not injected.   EOMI grossly.  EARS: Hearing intact to spoken voice.  No gross outer ear deformity.    NECK: Appears normal.  Supple.  No lymphadenopathy.  No thyromegaly.  RESPIRATORY: Clear to auscultation bilaterally.  Normal inspiratory to expiratory ratio.  Normal rate and excursion.  No adventitious breath sounds.  CARDIAC: Regular rate and rhythm.   No murmur, rub, or gallop noted. No peripheral edema noted.  SKIN: Unremarkable.  No worrisome lesions were seen. No significant rash.  Color is normal. Warm.    MUSCULOSKELETAL: Appears grossly normal.  No abnormalities noted.  Upper and lower extremity strength 5/5. No gross joint or muscle swelling or deformity.    NEUROLOGICAL:   Alert and oriented.  Gait normal.  Movements grossly normal.    PSYCHIATRIC:  Affect full range.  Impulsive.  Insight poor.  HEMATOLOGIC / LYMPHATIC - No gross bruising    Past Medical History:   Diagnosis Date    Acne     ADHD     Asperger syndrome     Asthma     with respiratory illness    Autism     Candidal intertrigo 03/22/2024    Resolves with desitin + azole use      Encopresis     Environmental allergies     Insomnia     Nocturnal enuresis     Prediabetes     Seizure (HCC) 09/06/2023    Sleep apnea       Past Surgical History:   Procedure Laterality Date    CIRCUMCISION      EAR TUBE REMOVAL      MYRINGOTOMY      12 surgical placement tubes    TONSILLECTOMY AND ADENOIDECTOMY      UPPER GASTROINTESTINAL ENDOSCOPY N/A 06/19/2023    EGD ESOPHAGOGASTRODUODENOSCOPY performed by Ghazala Knight MD at [facility] ENDOSCOPY    UPPER GASTROINTESTINAL ENDOSCOPY N/A

## 2025-02-26 ENCOUNTER — PATIENT MESSAGE (OUTPATIENT)
Facility: CLINIC | Age: 25
End: 2025-02-26

## 2025-02-26 PROBLEM — B37.2 CANDIDAL INTERTRIGO: Status: RESOLVED | Noted: 2024-03-22 | Resolved: 2025-02-26

## 2025-02-26 LAB
ALBUMIN SERPL-MCNC: 4.5 G/DL (ref 4.3–5.2)
ALP SERPL-CCNC: 44 IU/L (ref 44–121)
ALT SERPL-CCNC: 19 IU/L (ref 0–44)
AST SERPL-CCNC: 20 IU/L (ref 0–40)
BASOPHILS # BLD AUTO: 0 X10E3/UL (ref 0–0.2)
BASOPHILS NFR BLD AUTO: 1 %
BILIRUB SERPL-MCNC: 0.5 MG/DL (ref 0–1.2)
BUN SERPL-MCNC: 7 MG/DL (ref 6–20)
BUN/CREAT SERPL: 8 (ref 9–20)
CALCIUM SERPL-MCNC: 9.6 MG/DL (ref 8.7–10.2)
CHLORIDE SERPL-SCNC: 102 MMOL/L (ref 96–106)
CHOLEST SERPL-MCNC: 158 MG/DL (ref 100–199)
CO2 SERPL-SCNC: 23 MMOL/L (ref 20–29)
CREAT SERPL-MCNC: 0.86 MG/DL (ref 0.76–1.27)
EGFRCR SERPLBLD CKD-EPI 2021: 124 ML/MIN/1.73
EOSINOPHIL # BLD AUTO: 0.1 X10E3/UL (ref 0–0.4)
EOSINOPHIL NFR BLD AUTO: 2 %
ERYTHROCYTE [DISTWIDTH] IN BLOOD BY AUTOMATED COUNT: 13.5 % (ref 11.6–15.4)
GLOBULIN SER CALC-MCNC: 2.1 G/DL (ref 1.5–4.5)
GLUCOSE SERPL-MCNC: 73 MG/DL (ref 70–99)
HBA1C MFR BLD: 5.2 % (ref 4.8–5.6)
HCT VFR BLD AUTO: 42 % (ref 37.5–51)
HDLC SERPL-MCNC: 35 MG/DL
HGB BLD-MCNC: 14.3 G/DL (ref 13–17.7)
IMM GRANULOCYTES # BLD AUTO: 0 X10E3/UL (ref 0–0.1)
IMM GRANULOCYTES NFR BLD AUTO: 0 %
LDLC SERPL CALC-MCNC: 96 MG/DL (ref 0–99)
LYMPHOCYTES # BLD AUTO: 2.5 X10E3/UL (ref 0.7–3.1)
LYMPHOCYTES NFR BLD AUTO: 45 %
MCH RBC QN AUTO: 30.2 PG (ref 26.6–33)
MCHC RBC AUTO-ENTMCNC: 34 G/DL (ref 31.5–35.7)
MCV RBC AUTO: 89 FL (ref 79–97)
MONOCYTES # BLD AUTO: 0.6 X10E3/UL (ref 0.1–0.9)
MONOCYTES NFR BLD AUTO: 10 %
NEUTROPHILS # BLD AUTO: 2.3 X10E3/UL (ref 1.4–7)
NEUTROPHILS NFR BLD AUTO: 42 %
PLATELET # BLD AUTO: 249 X10E3/UL (ref 150–450)
POTASSIUM SERPL-SCNC: 4.2 MMOL/L (ref 3.5–5.2)
PROT SERPL-MCNC: 6.6 G/DL (ref 6–8.5)
RBC # BLD AUTO: 4.74 X10E6/UL (ref 4.14–5.8)
SODIUM SERPL-SCNC: 140 MMOL/L (ref 134–144)
TRIGL SERPL-MCNC: 155 MG/DL (ref 0–149)
TSH SERPL DL<=0.005 MIU/L-ACNC: 2.2 UIU/ML (ref 0.45–4.5)
VLDLC SERPL CALC-MCNC: 27 MG/DL (ref 5–40)
WBC # BLD AUTO: 5.5 X10E3/UL (ref 3.4–10.8)

## 2025-02-27 NOTE — TELEPHONE ENCOUNTER
Future Appointments:  Future Appointments   Date Time Provider Department Center   5/1/2025  1:40 PM Peggy Garcia, APRN - CNP [facility] DE[facility]        Last Appointment With Me:  2/25/2025     Requested Prescriptions     Pending Prescriptions Disp Refills    Tirzepatide 10 MG/0.5ML SOAJ 2 mL 0     Sig: Inject 10 mg into the skin every 7 days

## 2025-03-16 ENCOUNTER — RESULTS FOLLOW-UP (OUTPATIENT)
Facility: CLINIC | Age: 25
End: 2025-03-16

## 2025-04-15 RX ORDER — LEVOCETIRIZINE DIHYDROCHLORIDE 5 MG/1
5 TABLET, FILM COATED ORAL NIGHTLY
Qty: 90 TABLET | Refills: 3 | Status: SHIPPED | OUTPATIENT
Start: 2025-04-15

## 2025-04-15 NOTE — TELEPHONE ENCOUNTER
Future Appointments:  Future Appointments   Date Time Provider Department Center   5/1/2025  1:40 PM Peggy Garcia, APRN - CNP [facility]        Last Appointment With Me:  2/25/2025     Requested Prescriptions     Pending Prescriptions Disp Refills    levocetirizine (XYZAL ALLERGY 24HR) 5 MG tablet 90 tablet 3     Sig: Take 1 tablet by mouth nightly

## 2025-05-01 ENCOUNTER — OFFICE VISIT (OUTPATIENT)
Facility: CLINIC | Age: 25
End: 2025-05-01
Payer: MEDICAID

## 2025-05-01 VITALS
SYSTOLIC BLOOD PRESSURE: 88 MMHG | BODY MASS INDEX: 42.62 KG/M2 | WEIGHT: 255.8 LBS | HEART RATE: 82 BPM | OXYGEN SATURATION: 94 % | RESPIRATION RATE: 16 BRPM | TEMPERATURE: 98.4 F | HEIGHT: 65 IN | DIASTOLIC BLOOD PRESSURE: 64 MMHG

## 2025-05-01 DIAGNOSIS — Z11.1 SCREENING-PULMONARY TB: ICD-10-CM

## 2025-05-01 DIAGNOSIS — K76.0 FATTY LIVER: ICD-10-CM

## 2025-05-01 DIAGNOSIS — R15.9 FULL INCONTINENCE OF FECES: ICD-10-CM

## 2025-05-01 DIAGNOSIS — Z00.01 ENCOUNTER FOR WELL ADULT EXAM WITH ABNORMAL FINDINGS: Primary | ICD-10-CM

## 2025-05-01 DIAGNOSIS — R14.2 BELCHING: ICD-10-CM

## 2025-05-01 DIAGNOSIS — R10.84 GENERALIZED ABDOMINAL PAIN: ICD-10-CM

## 2025-05-01 DIAGNOSIS — R73.09 BLOOD GLUCOSE ABNORMAL: ICD-10-CM

## 2025-05-01 DIAGNOSIS — R15.9 ENCOPRESIS: ICD-10-CM

## 2025-05-01 DIAGNOSIS — Z13.220 SCREENING FOR LIPOID DISORDERS: ICD-10-CM

## 2025-05-01 DIAGNOSIS — E66.813 CLASS 3 SEVERE OBESITY DUE TO EXCESS CALORIES WITH SERIOUS COMORBIDITY AND BODY MASS INDEX (BMI) OF 40.0 TO 44.9 IN ADULT (HCC): ICD-10-CM

## 2025-05-01 DIAGNOSIS — F84.9 INTELLECTUAL DISABILITY WITH LANGUAGE IMPAIRMENT AND AUTISTIC FEATURES: ICD-10-CM

## 2025-05-01 DIAGNOSIS — K21.9 GASTRO-ESOPHAGEAL REFLUX DISEASE WITHOUT ESOPHAGITIS: ICD-10-CM

## 2025-05-01 DIAGNOSIS — F80.9 INTELLECTUAL DISABILITY WITH LANGUAGE IMPAIRMENT AND AUTISTIC FEATURES: ICD-10-CM

## 2025-05-01 DIAGNOSIS — N39.44 NOCTURNAL ENURESIS: ICD-10-CM

## 2025-05-01 PROCEDURE — 99213 OFFICE O/P EST LOW 20 MIN: CPT | Performed by: NURSE PRACTITIONER

## 2025-05-01 PROCEDURE — 99395 PREV VISIT EST AGE 18-39: CPT | Performed by: NURSE PRACTITIONER

## 2025-05-01 RX ORDER — MULTIVIT-MIN/FOLIC ACID/HRB293 120 MCG-50
TABLET,CHEWABLE ORAL
Qty: 200 TABLET | Refills: 3 | Status: SHIPPED | OUTPATIENT
Start: 2025-05-01

## 2025-05-01 RX ORDER — GUAIFENESIN 600 MG/1
600 TABLET, EXTENDED RELEASE ORAL 2 TIMES DAILY PRN
Qty: 60 TABLET | Refills: 3 | Status: SHIPPED | OUTPATIENT
Start: 2025-05-01

## 2025-05-01 NOTE — PROGRESS NOTES
Well Adult Note  Name: Trey Mcclain Today’s Date: 2025   MRN: [id number] Sex: Male   Age: 25 y.o. Ethnicity: Declined   : 2000 Race: White (non-)      Trey Mcclain is here for a well adult exam.       Assessment & Plan   Encounter for well adult exam with abnormal findings  -     Multiple Vitamins-Minerals (ALIVE MENS GUMMY MULTIVITAMINS) CHEW; Take one dose of multivitamin gummy daily (any brand ok), Disp-200 tablet, R-[id number]  Belching  -     AFL - Lila Brar MD, Gastroenterology, [place]  Nocturnal enuresis  -     AFL - Slick Llamas MD, Urology, [place]  Fatty liver  -     DAVID - Lila Brar MD, Gastroenterology, [place]  -     TSH; Future  -     Comprehensive Metabolic Panel; Future  Class 3 severe obesity due to excess calories with serious comorbidity and body mass index (BMI) of 40.0 to 44.9 in adult (HCC)  Encopresis  -     DAVID - Lila Brar MD, Gastroenterology, [place]  -     CBC with Auto Differential; Future  Gastro-esophageal reflux disease without esophagitis  -     [facility]  Full incontinence of feces  -     DAVID [facility]  Blood glucose abnormal  -     Hemoglobin A1C; Future  Screening for lipoid disorders  -     Lipid Panel; Future  Intellectual disability with language impairment and autistic features  Screening-pulmonary TB  Comments:  For caregiver paperwork  Orders:  -     QuantiFERON In Tube ([facility] Default); Future  Generalized abdominal pain  -     Gluten Sensitivity Antibodies Cascade      Return in about 6 months (around 2025) for Routine follow up and labs - OVE.       Subjective   History:    Trey reports ongoing trouble with nocturia - but more specifically, after(?) masturbation/ejaculation.     Mom is changing sheets about 2x/week as a result   Refer to urology    Obesity   Body mass index is

## 2025-05-02 LAB
ALBUMIN SERPL-MCNC: 4.5 G/DL (ref 4.3–5.2)
ALP SERPL-CCNC: 40 IU/L (ref 44–121)
ALT SERPL-CCNC: 15 IU/L (ref 0–44)
AST SERPL-CCNC: 16 IU/L (ref 0–40)
BASOPHILS # BLD AUTO: 0 X10E3/UL (ref 0–0.2)
BASOPHILS NFR BLD AUTO: 1 %
BILIRUB SERPL-MCNC: 0.3 MG/DL (ref 0–1.2)
BUN SERPL-MCNC: 12 MG/DL (ref 6–20)
BUN/CREAT SERPL: 15 (ref 9–20)
CALCIUM SERPL-MCNC: 9.3 MG/DL (ref 8.7–10.2)
CHLORIDE SERPL-SCNC: 99 MMOL/L (ref 96–106)
CHOLEST SERPL-MCNC: 149 MG/DL (ref 100–199)
CO2 SERPL-SCNC: 25 MMOL/L (ref 20–29)
CREAT SERPL-MCNC: 0.78 MG/DL (ref 0.76–1.27)
EGFRCR SERPLBLD CKD-EPI 2021: 127 ML/MIN/1.73
EOSINOPHIL # BLD AUTO: 0.2 X10E3/UL (ref 0–0.4)
EOSINOPHIL NFR BLD AUTO: 3 %
ERYTHROCYTE [DISTWIDTH] IN BLOOD BY AUTOMATED COUNT: 13.2 % (ref 11.6–15.4)
GLOBULIN SER CALC-MCNC: 1.9 G/DL (ref 1.5–4.5)
GLUCOSE SERPL-MCNC: 97 MG/DL (ref 70–99)
HBA1C MFR BLD: 5.5 % (ref 4.8–5.6)
HCT VFR BLD AUTO: 40.8 % (ref 37.5–51)
HDLC SERPL-MCNC: 28 MG/DL
HGB BLD-MCNC: 13.8 G/DL (ref 13–17.7)
IMM GRANULOCYTES # BLD AUTO: 0 X10E3/UL (ref 0–0.1)
IMM GRANULOCYTES NFR BLD AUTO: 1 %
LDLC SERPL CALC-MCNC: 78 MG/DL (ref 0–99)
LYMPHOCYTES # BLD AUTO: 2.3 X10E3/UL (ref 0.7–3.1)
LYMPHOCYTES NFR BLD AUTO: 39 %
MCH RBC QN AUTO: 30.2 PG (ref 26.6–33)
MCHC RBC AUTO-ENTMCNC: 33.8 G/DL (ref 31.5–35.7)
MCV RBC AUTO: 89 FL (ref 79–97)
MONOCYTES # BLD AUTO: 0.4 X10E3/UL (ref 0.1–0.9)
MONOCYTES NFR BLD AUTO: 7 %
NEUTROPHILS # BLD AUTO: 3 X10E3/UL (ref 1.4–7)
NEUTROPHILS NFR BLD AUTO: 49 %
PLATELET # BLD AUTO: 243 X10E3/UL (ref 150–450)
POTASSIUM SERPL-SCNC: 4.5 MMOL/L (ref 3.5–5.2)
PROT SERPL-MCNC: 6.4 G/DL (ref 6–8.5)
RBC # BLD AUTO: 4.57 X10E6/UL (ref 4.14–5.8)
SODIUM SERPL-SCNC: 138 MMOL/L (ref 134–144)
TRIGL SERPL-MCNC: 258 MG/DL (ref 0–149)
TSH SERPL DL<=0.005 MIU/L-ACNC: 1.68 UIU/ML (ref 0.45–4.5)
VLDLC SERPL CALC-MCNC: 43 MG/DL (ref 5–40)
WBC # BLD AUTO: 5.9 X10E3/UL (ref 3.4–10.8)

## 2025-05-06 LAB
GAMMA INTERFERON BACKGROUND BLD IA-ACNC: 0.22 IU/ML
M TB IFN-G BLD-IMP: NEGATIVE
M TB IFN-G CD4+ BCKGRND COR BLD-ACNC: 0.24 IU/ML
M TB IFN-G CD4+CD8+ BCKGRND COR BLD-ACNC: 0.2 IU/ML
MITOGEN IGNF BCKGRD COR BLD-ACNC: >10 IU/ML
QUANTIFERON, INCUBATION: NORMAL
SERVICE CMNT-IMP: NORMAL

## 2025-05-14 ENCOUNTER — RESULTS FOLLOW-UP (OUTPATIENT)
Facility: CLINIC | Age: 25
End: 2025-05-14

## 2025-05-21 DIAGNOSIS — E66.813 CLASS 3 SEVERE OBESITY DUE TO EXCESS CALORIES WITH SERIOUS COMORBIDITY AND BODY MASS INDEX (BMI) OF 40.0 TO 44.9 IN ADULT (HCC): ICD-10-CM

## 2025-05-21 NOTE — TELEPHONE ENCOUNTER
PCP: Peggy Garcia APRN - CNP     Last appt:  5/1/2025      Future Appointments   Date Time Provider Department Center   11/3/2025  9:00 AM Peggy Garcia APRN - CNP [facility] E[facility] DEP          Requested Prescriptions     Pending Prescriptions Disp Refills    ZEPBOUND 10 MG/0.5ML SOAJ subCUTAneous auto-injector pen [Pharmacy Med Name: ZEPBOUND 10MG/0.5ML SOLUTION AUTO-INJECTOR] 2 mL 2     Sig: INJECT 10 MG INTO THE SKIN EVERY 7 DAYS

## 2025-05-22 RX ORDER — TIRZEPATIDE 10 MG/.5ML
INJECTION, SOLUTION SUBCUTANEOUS
Qty: 2 ML | Refills: 2 | Status: SHIPPED | OUTPATIENT
Start: 2025-05-22

## 2025-05-22 NOTE — TELEPHONE ENCOUNTER
Future Appointments:  Future Appointments   Date Time Provider Department Center   11/3/2025  9:00 AM Peggy Garcia, APRN - CNP [facility] DE[facility]        Last Appointment With Me:  5/1/2025     Requested Prescriptions     Pending Prescriptions Disp Refills    tirzepatide-weight management (ZEPBOUND) 10 MG/0.5ML SOAJ subCUTAneous auto-injector pen 2 mL 0     Sig: Inject 10 mg into the skin every 7 days

## 2025-07-16 ENCOUNTER — PATIENT MESSAGE (OUTPATIENT)
Facility: CLINIC | Age: 25
End: 2025-07-16

## 2025-07-16 NOTE — TELEPHONE ENCOUNTER
Future Appointments:  Future Appointments   Date Time Provider Department Center   11/3/2025  9:00 AM Peggy Garcia, APRN - CNP [facility]        Last Appointment With Me:  5/1/2025     Requested Prescriptions     Pending Prescriptions Disp Refills    tirzepatide-weight management (ZEPBOUND) 12.5 MG/0.5ML SOAJ subCUTAneous auto-injector pen 2 mL 0     Sig: Inject 12.5 mg into the skin every 7 days

## 2025-07-22 DIAGNOSIS — R06.2 WHEEZING: ICD-10-CM

## 2025-07-22 RX ORDER — ALBUTEROL SULFATE 90 UG/1
2 AEROSOL, METERED RESPIRATORY (INHALATION) EVERY 4 HOURS PRN
Qty: 18 G | Refills: 4 | Status: SHIPPED | OUTPATIENT
Start: 2025-07-22

## 2025-07-22 RX ORDER — ALBUTEROL SULFATE 90 UG/1
2 INHALANT RESPIRATORY (INHALATION) 4 TIMES DAILY PRN
Qty: 18 G | Refills: 5 | Status: SHIPPED | OUTPATIENT
Start: 2025-07-22

## 2025-07-22 NOTE — TELEPHONE ENCOUNTER
Future Appointments:  Future Appointments   Date Time Provider Department Center   11/3/2025  9:00 AM [facility] Garcia[facility]        Last Appointment With Me:  5/1/2025     Requested Prescriptions     Pending Prescriptions Disp Refills    albuterol sulfate HFA (VENTOLIN HFA) 108 (90 Base) MCG/ACT inhaler 18 g 5     Sig: Inhale 2 puffs into the lungs 4 times daily as needed for Wheezing

## 2025-07-22 NOTE — TELEPHONE ENCOUNTER
PCP: Peggy Garcia APRN - CNP     Last appt:  5/1/2025      Future Appointments   Date Time Provider Department Center   11/3/2025  9:00 AM Peggy Garcia APRN - CNP [facility] E[facility] DEP          Requested Prescriptions     Pending Prescriptions Disp Refills    VENTOLIN  (90 Base) MCG/ACT inhaler [Pharmacy Med Name: VENTOLIN HFA 90 MCG INHALER] 18 g 4     Sig: INHALE 2 PUFFS BY MOUTH EVERY 4 HOURS AS NEEDED FOR WHEEZING

## 2025-07-22 NOTE — TELEPHONE ENCOUNTER
Caller requests Refill of:  albuterol sulfate HFA (VENTOLIN HFA) 108 (90 Base) MCG/ACT inhaler       Please send to:    [facility] pharmacy long term    Visit Appointment History:  Future Appointments:  Future Appointments   Date Time Provider Department Center   11/3/2025  9:00 AM Peggy Garcia, APRN - CNP [facility] DE[facility]         Last Appointment With Me:  5/1/2025         Caller confirmed instructions and dosages as correct.    Caller was advised that Meds will be refilled as soon as possible, however there can be a 48-72 business hour turn around on refill requests.

## 2025-08-13 ENCOUNTER — TELEPHONE (OUTPATIENT)
Facility: CLINIC | Age: 25
End: 2025-08-13

## (undated) DEVICE — TUBING IRRIG COMPATIBLE W ERBE MEDIVATOR PMP HYDR

## (undated) DEVICE — FORCEPS BX 240CM 2.4MM L NDL RAD JAW 4 M00513334